# Patient Record
Sex: FEMALE | Race: WHITE
[De-identification: names, ages, dates, MRNs, and addresses within clinical notes are randomized per-mention and may not be internally consistent; named-entity substitution may affect disease eponyms.]

---

## 2017-05-22 ENCOUNTER — PRIOR ORIGINAL RECORDS (OUTPATIENT)
Dept: OTHER | Age: 72
End: 2017-05-22

## 2017-05-22 PROBLEM — I70.0 AORTIC ATHEROSCLEROSIS (HCC): Status: ACTIVE | Noted: 2017-05-22

## 2017-05-22 PROCEDURE — 82652 VIT D 1 25-DIHYDROXY: CPT | Performed by: FAMILY MEDICINE

## 2017-05-23 ENCOUNTER — PRIOR ORIGINAL RECORDS (OUTPATIENT)
Dept: OTHER | Age: 72
End: 2017-05-23

## 2017-05-31 ENCOUNTER — LAB SERVICES (OUTPATIENT)
Dept: OTHER | Age: 72
End: 2017-05-31

## 2017-05-31 ENCOUNTER — CHARTING TRANS (OUTPATIENT)
Dept: OTHER | Age: 72
End: 2017-05-31

## 2017-05-31 LAB — RAPID STREP GROUP A: NORMAL

## 2017-08-29 ENCOUNTER — MYAURORA ACCOUNT LINK (OUTPATIENT)
Dept: OTHER | Age: 72
End: 2017-08-29

## 2017-08-29 ENCOUNTER — PRIOR ORIGINAL RECORDS (OUTPATIENT)
Dept: OTHER | Age: 72
End: 2017-08-29

## 2017-09-05 ENCOUNTER — PRIOR ORIGINAL RECORDS (OUTPATIENT)
Dept: OTHER | Age: 72
End: 2017-09-05

## 2017-09-11 LAB
ALBUMIN: 4.2 G/DL
ALKALINE PHOSPHATATE(ALK PHOS): 94 IU/L
BILIRUBIN TOTAL: 0.47 MG/DL
BUN: 19 MG/DL
CALCIUM: 10.3 MG/DL
CHLORIDE: 107 MEQ/L
CHOLESTEROL, TOTAL: 165 MG/DL
CREATININE, SERUM: 1.08 MG/DL
GLUCOSE: 85 MG/DL
HDL CHOLESTEROL: 46 MG/DL
HEMATOCRIT: 44.3 %
HEMOGLOBIN: 14.4 G/DL
LDL CHOLESTEROL: 75 MG/DL
PLATELETS: 395 K/UL
POTASSIUM, SERUM: 5 MEQ/L
PROTEIN, TOTAL: 8.1 G/DL
RED BLOOD COUNT: 4.97 X 10-6/U
SGOT (AST): 13 IU/L
SGPT (ALT): 18 IU/L
SODIUM: 144 MEQ/L
THYROID STIMULATING HORMONE: 2.43 MLU/L
TRIGLYCERIDES: 218 MG/DL
VITAMIN D 25-OH: 29.5 NG/ML
WHITE BLOOD COUNT: 12.87 X 10-3/U

## 2017-09-20 ENCOUNTER — PRIOR ORIGINAL RECORDS (OUTPATIENT)
Dept: OTHER | Age: 72
End: 2017-09-20

## 2017-09-22 ENCOUNTER — PRIOR ORIGINAL RECORDS (OUTPATIENT)
Dept: OTHER | Age: 72
End: 2017-09-22

## 2017-09-22 ENCOUNTER — HOSPITAL ENCOUNTER (OUTPATIENT)
Dept: CT IMAGING | Facility: HOSPITAL | Age: 72
Discharge: HOME | End: 2017-09-22
Attending: INTERNAL MEDICINE
Payer: MEDICARE

## 2017-09-22 ENCOUNTER — NURSE ONLY (OUTPATIENT)
Dept: LAB | Facility: HOSPITAL | Age: 72
End: 2017-09-22
Attending: INTERNAL MEDICINE
Payer: MEDICARE

## 2017-09-22 DIAGNOSIS — I25.10 CAD (CORONARY ARTERY DISEASE): ICD-10-CM

## 2017-09-22 DIAGNOSIS — I70.0 ATHEROSCLEROSIS OF AORTA (HCC): ICD-10-CM

## 2017-09-22 DIAGNOSIS — Z01.818 PRE-OP EXAM: Primary | ICD-10-CM

## 2017-09-22 DIAGNOSIS — G80.9 CP (CEREBRAL PALSY) (HCC): ICD-10-CM

## 2017-09-22 DIAGNOSIS — R06.02 SOB (SHORTNESS OF BREATH): ICD-10-CM

## 2017-09-22 LAB
BUN BLD-MCNC: 18 MG/DL (ref 8–20)
CREAT BLD-MCNC: 1.1 MG/DL (ref 0.55–1.02)

## 2017-09-22 PROCEDURE — 36415 COLL VENOUS BLD VENIPUNCTURE: CPT

## 2017-09-22 PROCEDURE — 75574 CT ANGIO HRT W/3D IMAGE: CPT | Performed by: INTERNAL MEDICINE

## 2017-09-22 PROCEDURE — 82565 ASSAY OF CREATININE: CPT

## 2017-09-22 PROCEDURE — 84520 ASSAY OF UREA NITROGEN: CPT

## 2017-09-22 RX ORDER — METOPROLOL TARTRATE 5 MG/5ML
INJECTION INTRAVENOUS
Status: DISCONTINUED
Start: 2017-09-22 | End: 2017-09-22 | Stop reason: WASHOUT

## 2017-09-22 RX ORDER — NITROGLYCERIN 0.4 MG/1
TABLET SUBLINGUAL
Status: COMPLETED
Start: 2017-09-22 | End: 2017-09-22

## 2017-09-22 RX ADMIN — NITROGLYCERIN: 0.4 TABLET SUBLINGUAL at 09:30:00

## 2017-09-28 LAB
BUN: 18 MG/DL
CREATININE, SERUM: 1.1 MG/DL

## 2017-09-29 ENCOUNTER — PRIOR ORIGINAL RECORDS (OUTPATIENT)
Dept: OTHER | Age: 72
End: 2017-09-29

## 2017-10-02 ENCOUNTER — PRIOR ORIGINAL RECORDS (OUTPATIENT)
Dept: OTHER | Age: 72
End: 2017-10-02

## 2017-10-05 ENCOUNTER — PRIOR ORIGINAL RECORDS (OUTPATIENT)
Dept: OTHER | Age: 72
End: 2017-10-05

## 2017-10-05 ENCOUNTER — LAB ENCOUNTER (OUTPATIENT)
Dept: LAB | Facility: HOSPITAL | Age: 72
End: 2017-10-05
Attending: INTERNAL MEDICINE
Payer: MEDICARE

## 2017-10-05 ENCOUNTER — MYAURORA ACCOUNT LINK (OUTPATIENT)
Dept: OTHER | Age: 72
End: 2017-10-05

## 2017-10-05 DIAGNOSIS — I25.10 CAD (CORONARY ARTERY DISEASE): Primary | ICD-10-CM

## 2017-10-05 DIAGNOSIS — E78.2 MIXED HYPERLIPIDEMIA: ICD-10-CM

## 2017-10-05 PROCEDURE — 80048 BASIC METABOLIC PNL TOTAL CA: CPT

## 2017-10-05 PROCEDURE — 85025 COMPLETE CBC W/AUTO DIFF WBC: CPT

## 2017-10-05 PROCEDURE — 36415 COLL VENOUS BLD VENIPUNCTURE: CPT

## 2017-10-09 RX ORDER — ASPIRIN 325 MG
325 TABLET ORAL DAILY
COMMUNITY

## 2017-10-09 NOTE — HISTORICAL OFFICE NOTE
Dorita Fragoso  : 1945  ACCOUNT:  [id number]  [id number]  PCP: Dr. Nacho Ugarte: 2017  DICTATED BY:  Edith Serrano M.D.]    HPI: This is a follow-up visit for Linden Mariano.  It has been several years since she saw me in the off clear to auscultation. GI: no masses, tenderness or hepatosplenomegaly, rectal deferred and central adiposity. MS: adequate gait for exercise/testing. EXT: no clubbing or cyanosis.   SKIN: no rashes, lesions, ulcers and vericosities LE.  NEURO/PSYCH: alert understanding and we will move forward as planned.

## 2017-10-11 ENCOUNTER — HOSPITAL ENCOUNTER (OUTPATIENT)
Dept: INTERVENTIONAL RADIOLOGY/VASCULAR | Facility: HOSPITAL | Age: 72
Discharge: HOME | End: 2017-10-11
Attending: INTERNAL MEDICINE | Admitting: INTERNAL MEDICINE
Payer: MEDICARE

## 2017-10-11 VITALS
SYSTOLIC BLOOD PRESSURE: 117 MMHG | DIASTOLIC BLOOD PRESSURE: 62 MMHG | HEART RATE: 86 BPM | WEIGHT: 197 LBS | RESPIRATION RATE: 18 BRPM | BODY MASS INDEX: 31.66 KG/M2 | OXYGEN SATURATION: 99 % | HEIGHT: 66 IN | TEMPERATURE: 98 F

## 2017-10-11 DIAGNOSIS — I25.10 ASHD (ARTERIOSCLEROTIC HEART DISEASE): ICD-10-CM

## 2017-10-11 DIAGNOSIS — I25.10 CAD (CORONARY ARTERY DISEASE): ICD-10-CM

## 2017-10-11 PROCEDURE — 99153 MOD SED SAME PHYS/QHP EA: CPT

## 2017-10-11 PROCEDURE — 93458 L HRT ARTERY/VENTRICLE ANGIO: CPT

## 2017-10-11 PROCEDURE — 99152 MOD SED SAME PHYS/QHP 5/>YRS: CPT

## 2017-10-11 PROCEDURE — 92978 ENDOLUMINL IVUS OCT C 1ST: CPT

## 2017-10-11 PROCEDURE — 4A033BC MEASUREMENT OF ARTERIAL PRESSURE, CORONARY, PERCUTANEOUS APPROACH: ICD-10-PCS | Performed by: INTERNAL MEDICINE

## 2017-10-11 PROCEDURE — B240ZZ3 ULTRASONOGRAPHY OF SINGLE CORONARY ARTERY, INTRAVASCULAR: ICD-10-PCS | Performed by: INTERNAL MEDICINE

## 2017-10-11 PROCEDURE — 4A023N7 MEASUREMENT OF CARDIAC SAMPLING AND PRESSURE, LEFT HEART, PERCUTANEOUS APPROACH: ICD-10-PCS | Performed by: INTERNAL MEDICINE

## 2017-10-11 PROCEDURE — B2111ZZ FLUOROSCOPY OF MULTIPLE CORONARY ARTERIES USING LOW OSMOLAR CONTRAST: ICD-10-PCS | Performed by: INTERNAL MEDICINE

## 2017-10-11 PROCEDURE — B2151ZZ FLUOROSCOPY OF LEFT HEART USING LOW OSMOLAR CONTRAST: ICD-10-PCS | Performed by: INTERNAL MEDICINE

## 2017-10-11 PROCEDURE — 93571 IV DOP VEL&/PRESS C FLO 1ST: CPT

## 2017-10-11 RX ORDER — SODIUM CHLORIDE 9 MG/ML
INJECTION, SOLUTION INTRAVENOUS CONTINUOUS
Status: ACTIVE | OUTPATIENT
Start: 2017-10-11 | End: 2017-10-11

## 2017-10-11 RX ORDER — LIDOCAINE HYDROCHLORIDE 10 MG/ML
INJECTION, SOLUTION INFILTRATION; PERINEURAL
Status: COMPLETED
Start: 2017-10-11 | End: 2017-10-11

## 2017-10-11 RX ORDER — MIDAZOLAM HYDROCHLORIDE 1 MG/ML
INJECTION INTRAMUSCULAR; INTRAVENOUS
Status: COMPLETED
Start: 2017-10-11 | End: 2017-10-11

## 2017-10-11 RX ORDER — HEPARIN SODIUM 5000 [USP'U]/ML
INJECTION, SOLUTION INTRAVENOUS; SUBCUTANEOUS
Status: COMPLETED
Start: 2017-10-11 | End: 2017-10-11

## 2017-10-11 RX ORDER — ASPIRIN 81 MG/1
324 TABLET, CHEWABLE ORAL ONCE
Status: DISCONTINUED | OUTPATIENT
Start: 2017-10-11 | End: 2017-10-11

## 2017-10-11 RX ORDER — ADENOSINE 3 MG/ML
INJECTION, SOLUTION INTRAVENOUS
Status: COMPLETED
Start: 2017-10-11 | End: 2017-10-11

## 2017-10-11 RX ORDER — HEPARIN SODIUM 1000 [USP'U]/ML
INJECTION, SOLUTION INTRAVENOUS; SUBCUTANEOUS
Status: COMPLETED
Start: 2017-10-11 | End: 2017-10-11

## 2017-10-11 RX ORDER — SODIUM CHLORIDE 9 MG/ML
INJECTION, SOLUTION INTRAVENOUS CONTINUOUS
Status: DISCONTINUED | OUTPATIENT
Start: 2017-10-11 | End: 2017-10-11

## 2017-10-11 RX ADMIN — SODIUM CHLORIDE: 9 INJECTION, SOLUTION INTRAVENOUS at 11:45:00

## 2017-10-11 RX ADMIN — SODIUM CHLORIDE: 9 INJECTION, SOLUTION INTRAVENOUS at 08:00:00

## 2017-10-11 NOTE — PROGRESS NOTES
Patient is s/p C; a/o x 4; hemodynamically stable/neurologically intact; right groin site soft/stable with no bleeding/hematoma noted after ambulating in halls; denies pain; IV site removed intact;  Patient verbalized understanding of dc instructions/foll

## 2017-10-11 NOTE — PROCEDURES
[address]    PATIENT'S NAME: Yakov Erlindabrad   ATTENDING PHYSICIAN: Zaira Morris M.D. OPERATING PHYSICIAN: Zaira Morris M.D.    PATIENT ACCOUNT#:   [de-identified]    LOCATION:  [facility] 6 EDWP 10  MEDICAL RECORD #:   [id number]       DATE OF insufficiency was noted. No gradient was present across the aortic valve. CORONARY ARTERIOGRAPHY:  Coronary circulation was right dominant and a mild amount of vascular calcification was identified fluoroscopically.     The left main coronary artery had patient is a 11-year-old woman who on cardiac catheterization today was demonstrated to have moderate angiographic coronary atherosclerosis with preserved LV systolic function.   QCA intravascular ultrasound and FFR measurements were not supportive of flow-

## 2017-10-11 NOTE — PROGRESS NOTES
Prelim cath    Normal LV function   --- EDP 8    Mild - moderate angiographically non-obstructive CAD in LAD    LM/LCX/RCA randal    LAD assessed by IVUS/FFR and QCA    Greatest IVUS stenosis 54% proximally yet FFR was normal -- greatest QCA stenosis 44%

## 2017-10-13 LAB
BUN: 18 MG/DL
CALCIUM: 9.5 MG/DL
CHLORIDE: 111 MEQ/L
CREATININE, SERUM: 1.23 MG/DL
GLUCOSE: 93 MG/DL
HEMATOCRIT: 44.2 %
HEMOGLOBIN: 15 G/DL
PLATELETS: 290 K/UL
POTASSIUM, SERUM: 4.2 MEQ/L
RED BLOOD COUNT: 5.1 X 10-6/U
SODIUM: 140 MEQ/L
WHITE BLOOD COUNT: 8.7 X 10-3/U

## 2017-10-23 ENCOUNTER — PRIOR ORIGINAL RECORDS (OUTPATIENT)
Dept: OTHER | Age: 72
End: 2017-10-23

## 2017-10-23 ENCOUNTER — MYAURORA ACCOUNT LINK (OUTPATIENT)
Dept: OTHER | Age: 72
End: 2017-10-23

## 2018-12-02 VITALS
HEART RATE: 90 BPM | WEIGHT: 200.99 LBS | BODY MASS INDEX: 33.49 KG/M2 | HEIGHT: 65 IN | SYSTOLIC BLOOD PRESSURE: 100 MMHG | TEMPERATURE: 98.3 F | RESPIRATION RATE: 18 BRPM | DIASTOLIC BLOOD PRESSURE: 64 MMHG

## 2019-02-28 VITALS — SYSTOLIC BLOOD PRESSURE: 142 MMHG | DIASTOLIC BLOOD PRESSURE: 78 MMHG | HEART RATE: 92 BPM

## 2019-02-28 VITALS — DIASTOLIC BLOOD PRESSURE: 78 MMHG | SYSTOLIC BLOOD PRESSURE: 142 MMHG | HEART RATE: 92 BPM

## 2019-02-28 VITALS
HEART RATE: 102 BPM | DIASTOLIC BLOOD PRESSURE: 88 MMHG | WEIGHT: 203 LBS | SYSTOLIC BLOOD PRESSURE: 122 MMHG | BODY MASS INDEX: 33.78 KG/M2

## 2019-03-18 PROBLEM — N18.30 CKD (CHRONIC KIDNEY DISEASE) STAGE 3, GFR 30-59 ML/MIN (HCC): Status: ACTIVE | Noted: 2019-03-18

## 2019-05-30 PROCEDURE — 81003 URINALYSIS AUTO W/O SCOPE: CPT | Performed by: FAMILY MEDICINE

## 2020-06-01 ENCOUNTER — OFFICE VISIT (OUTPATIENT)
Dept: NEPHROLOGY | Facility: CLINIC | Age: 75
End: 2020-06-01
Payer: COMMERCIAL

## 2020-06-01 VITALS — SYSTOLIC BLOOD PRESSURE: 132 MMHG | BODY MASS INDEX: 34 KG/M2 | WEIGHT: 203.63 LBS | DIASTOLIC BLOOD PRESSURE: 74 MMHG

## 2020-06-01 DIAGNOSIS — N18.30 CKD (CHRONIC KIDNEY DISEASE) STAGE 3, GFR 30-59 ML/MIN (HCC): Primary | ICD-10-CM

## 2020-06-01 PROCEDURE — 99204 OFFICE O/P NEW MOD 45 MIN: CPT | Performed by: INTERNAL MEDICINE

## 2021-02-02 DIAGNOSIS — Z23 NEED FOR VACCINATION: ICD-10-CM

## 2022-02-16 NOTE — PROGRESS NOTES
Nephrology Consult Note    REASON FOR CONSULT: CKD 3    ASSESSMENT/PLAN:        1) CKD 3- serum creatinine approximately 1.1 to 1.2 mg/dL since 2016 likely represents PPI associated chronic interstitial fibrosis and possibly age associated nephrosclerosis; [place]. No history of acute renal failure gross microscopic hematuria kidney stones or infections. No family history of kidney disease. Took Nexium for a number of years about 10 years ago until 5 years ago.   Otherwise please see above for further d children: Not on file      Years of education: Not on file      Highest education level: Not on file    Tobacco Use      Smoking status: Former Smoker        Packs/day: 0.50        Years: 40.00        Pack years: 20        Types: Cigarettes        Quit edmar Not applicable

## 2022-07-28 ENCOUNTER — HOSPITAL ENCOUNTER (OUTPATIENT)
Age: 77
Discharge: HOME | End: 2022-07-28
Payer: MEDICARE

## 2022-07-28 VITALS
HEIGHT: 64 IN | HEART RATE: 70 BPM | WEIGHT: 190 LBS | SYSTOLIC BLOOD PRESSURE: 122 MMHG | DIASTOLIC BLOOD PRESSURE: 72 MMHG | RESPIRATION RATE: 16 BRPM | OXYGEN SATURATION: 97 % | TEMPERATURE: 97 F | BODY MASS INDEX: 32.44 KG/M2

## 2022-07-28 DIAGNOSIS — U07.1 COVID-19 VIRUS INFECTION: Primary | ICD-10-CM

## 2022-07-28 PROCEDURE — M0222 INTRAVENOUS INJECTION, BEBTELOVIMAB, INCLUDES INJECTION AND POST ADMINISTRATIVE MONITORING: HCPCS | Performed by: NURSE PRACTITIONER

## 2022-07-28 PROCEDURE — 99203 OFFICE O/P NEW LOW 30 MIN: CPT | Performed by: NURSE PRACTITIONER

## 2022-07-28 RX ORDER — BEBTELOVIMAB 87.5 MG/ML
175 INJECTION, SOLUTION INTRAVENOUS ONCE
Status: COMPLETED | OUTPATIENT
Start: 2022-07-28 | End: 2022-07-28

## 2023-05-23 ENCOUNTER — HOSPITAL ENCOUNTER (OUTPATIENT)
Dept: CV DIAGNOSTICS | Facility: HOSPITAL | Age: 78
Discharge: HOME | End: 2023-05-23
Attending: FAMILY MEDICINE
Payer: MEDICARE

## 2023-05-23 DIAGNOSIS — R06.09 DOE (DYSPNEA ON EXERTION): ICD-10-CM

## 2023-05-23 PROCEDURE — 93306 TTE W/DOPPLER COMPLETE: CPT | Performed by: FAMILY MEDICINE

## 2024-06-24 ENCOUNTER — ANESTHESIA (OUTPATIENT)
Dept: SURGERY | Facility: HOSPITAL | Age: 79
End: 2024-06-24

## 2024-06-24 ENCOUNTER — HOSPITAL ENCOUNTER (OUTPATIENT)
Facility: HOSPITAL | Age: 79
Setting detail: HOSPITAL OUTPATIENT SURGERY
Discharge: HOME | End: 2024-06-24
Attending: UROLOGY | Admitting: UROLOGY

## 2024-06-24 ENCOUNTER — ANESTHESIA EVENT (OUTPATIENT)
Dept: SURGERY | Facility: HOSPITAL | Age: 79
End: 2024-06-24

## 2024-06-24 ENCOUNTER — APPOINTMENT (OUTPATIENT)
Dept: GENERAL RADIOLOGY | Facility: HOSPITAL | Age: 79
End: 2024-06-24
Attending: UROLOGY

## 2024-06-24 VITALS
BODY MASS INDEX: 32.78 KG/M2 | HEART RATE: 80 BPM | OXYGEN SATURATION: 98 % | RESPIRATION RATE: 18 BRPM | WEIGHT: 192 LBS | TEMPERATURE: 98 F | DIASTOLIC BLOOD PRESSURE: 84 MMHG | HEIGHT: 64.25 IN | SYSTOLIC BLOOD PRESSURE: 148 MMHG

## 2024-06-24 DIAGNOSIS — N20.0 KIDNEY STONE: Primary | ICD-10-CM

## 2024-06-24 PROCEDURE — 0T768DZ DILATION OF RIGHT URETER WITH INTRALUMINAL DEVICE, VIA NATURAL OR ARTIFICIAL OPENING ENDOSCOPIC: ICD-10-PCS | Performed by: UROLOGY

## 2024-06-24 PROCEDURE — 0TF68ZZ FRAGMENTATION IN RIGHT URETER, VIA NATURAL OR ARTIFICIAL OPENING ENDOSCOPIC: ICD-10-PCS | Performed by: UROLOGY

## 2024-06-24 PROCEDURE — S0028 INJECTION, FAMOTIDINE, 20 MG: HCPCS | Performed by: ANESTHESIOLOGY

## 2024-06-24 DEVICE — URETERAL STENT
Type: IMPLANTABLE DEVICE | Site: URETER | Status: FUNCTIONAL
Brand: ASCERTA™

## 2024-06-24 RX ORDER — HYDROMORPHONE HYDROCHLORIDE 1 MG/ML
0.6 INJECTION, SOLUTION INTRAMUSCULAR; INTRAVENOUS; SUBCUTANEOUS EVERY 5 MIN PRN
Status: DISCONTINUED | OUTPATIENT
Start: 2024-06-24 | End: 2024-06-24

## 2024-06-24 RX ORDER — MEPERIDINE HYDROCHLORIDE 25 MG/ML
12.5 INJECTION INTRAMUSCULAR; INTRAVENOUS; SUBCUTANEOUS AS NEEDED
Status: DISCONTINUED | OUTPATIENT
Start: 2024-06-24 | End: 2024-06-24

## 2024-06-24 RX ORDER — ONDANSETRON 2 MG/ML
4 INJECTION INTRAMUSCULAR; INTRAVENOUS EVERY 6 HOURS PRN
Status: DISCONTINUED | OUTPATIENT
Start: 2024-06-24 | End: 2024-06-24

## 2024-06-24 RX ORDER — ACETAMINOPHEN 500 MG
1000 TABLET ORAL ONCE AS NEEDED
Status: COMPLETED | OUTPATIENT
Start: 2024-06-24 | End: 2024-06-24

## 2024-06-24 RX ORDER — HYDROCODONE BITARTRATE AND ACETAMINOPHEN 5; 325 MG/1; MG/1
2 TABLET ORAL ONCE AS NEEDED
Status: COMPLETED | OUTPATIENT
Start: 2024-06-24 | End: 2024-06-24

## 2024-06-24 RX ORDER — TRAMADOL HYDROCHLORIDE 50 MG/1
TABLET ORAL EVERY 6 HOURS PRN
Qty: 10 TABLET | Refills: 0 | Status: SHIPPED | OUTPATIENT
Start: 2024-06-24

## 2024-06-24 RX ORDER — SODIUM CHLORIDE, SODIUM LACTATE, POTASSIUM CHLORIDE, CALCIUM CHLORIDE 600; 310; 30; 20 MG/100ML; MG/100ML; MG/100ML; MG/100ML
INJECTION, SOLUTION INTRAVENOUS CONTINUOUS
Status: DISCONTINUED | OUTPATIENT
Start: 2024-06-24 | End: 2024-06-24

## 2024-06-24 RX ORDER — METOCLOPRAMIDE HYDROCHLORIDE 5 MG/ML
10 INJECTION INTRAMUSCULAR; INTRAVENOUS EVERY 8 HOURS PRN
Status: DISCONTINUED | OUTPATIENT
Start: 2024-06-24 | End: 2024-06-24

## 2024-06-24 RX ORDER — NEOSTIGMINE METHYLSULFATE 1 MG/ML
INJECTION, SOLUTION INTRAVENOUS AS NEEDED
Status: DISCONTINUED | OUTPATIENT
Start: 2024-06-24 | End: 2024-06-25 | Stop reason: SURG

## 2024-06-24 RX ORDER — THYROID 120 MG/1
120 TABLET ORAL DAILY
COMMUNITY
Start: 2023-04-18

## 2024-06-24 RX ORDER — NALOXONE HYDROCHLORIDE 0.4 MG/ML
80 INJECTION, SOLUTION INTRAMUSCULAR; INTRAVENOUS; SUBCUTANEOUS AS NEEDED
Status: DISCONTINUED | OUTPATIENT
Start: 2024-06-24 | End: 2024-06-24

## 2024-06-24 RX ORDER — GLYCOPYRROLATE 0.2 MG/ML
INJECTION, SOLUTION INTRAMUSCULAR; INTRAVENOUS AS NEEDED
Status: DISCONTINUED | OUTPATIENT
Start: 2024-06-24 | End: 2024-06-25 | Stop reason: SURG

## 2024-06-24 RX ORDER — HYDROMORPHONE HYDROCHLORIDE 1 MG/ML
0.2 INJECTION, SOLUTION INTRAMUSCULAR; INTRAVENOUS; SUBCUTANEOUS EVERY 5 MIN PRN
Status: DISCONTINUED | OUTPATIENT
Start: 2024-06-24 | End: 2024-06-24

## 2024-06-24 RX ORDER — LIDOCAINE HYDROCHLORIDE 10 MG/ML
INJECTION, SOLUTION EPIDURAL; INFILTRATION; INTRACAUDAL; PERINEURAL AS NEEDED
Status: DISCONTINUED | OUTPATIENT
Start: 2024-06-24 | End: 2024-06-25 | Stop reason: SURG

## 2024-06-24 RX ORDER — HYDROMORPHONE HYDROCHLORIDE 1 MG/ML
0.4 INJECTION, SOLUTION INTRAMUSCULAR; INTRAVENOUS; SUBCUTANEOUS EVERY 5 MIN PRN
Status: DISCONTINUED | OUTPATIENT
Start: 2024-06-24 | End: 2024-06-24

## 2024-06-24 RX ORDER — FAMOTIDINE 10 MG/ML
INJECTION, SOLUTION INTRAVENOUS AS NEEDED
Status: DISCONTINUED | OUTPATIENT
Start: 2024-06-24 | End: 2024-06-25 | Stop reason: SURG

## 2024-06-24 RX ORDER — LIDOCAINE HYDROCHLORIDE 20 MG/ML
JELLY TOPICAL AS NEEDED
Status: DISCONTINUED | OUTPATIENT
Start: 2024-06-24 | End: 2024-06-24 | Stop reason: HOSPADM

## 2024-06-24 RX ORDER — METOCLOPRAMIDE HYDROCHLORIDE 5 MG/ML
INJECTION INTRAMUSCULAR; INTRAVENOUS AS NEEDED
Status: DISCONTINUED | OUTPATIENT
Start: 2024-06-24 | End: 2024-06-25 | Stop reason: SURG

## 2024-06-24 RX ORDER — ROCURONIUM BROMIDE 10 MG/ML
INJECTION, SOLUTION INTRAVENOUS AS NEEDED
Status: DISCONTINUED | OUTPATIENT
Start: 2024-06-24 | End: 2024-06-25 | Stop reason: SURG

## 2024-06-24 RX ORDER — ONDANSETRON 2 MG/ML
INJECTION INTRAMUSCULAR; INTRAVENOUS AS NEEDED
Status: DISCONTINUED | OUTPATIENT
Start: 2024-06-24 | End: 2024-06-25 | Stop reason: SURG

## 2024-06-24 RX ORDER — NITROFURANTOIN 25; 75 MG/1; MG/1
100 CAPSULE ORAL 2 TIMES DAILY
Qty: 14 CAPSULE | Refills: 0 | Status: SHIPPED | OUTPATIENT
Start: 2024-06-24 | End: 2024-06-29

## 2024-06-24 RX ORDER — ACETAMINOPHEN 500 MG
1000 TABLET ORAL ONCE
Status: DISCONTINUED | OUTPATIENT
Start: 2024-06-24 | End: 2024-06-24 | Stop reason: HOSPADM

## 2024-06-24 RX ORDER — HYDROCODONE BITARTRATE AND ACETAMINOPHEN 5; 325 MG/1; MG/1
1 TABLET ORAL ONCE AS NEEDED
Status: COMPLETED | OUTPATIENT
Start: 2024-06-24 | End: 2024-06-24

## 2024-06-24 RX ORDER — DIPHENHYDRAMINE HYDROCHLORIDE 50 MG/ML
12.5 INJECTION INTRAMUSCULAR; INTRAVENOUS AS NEEDED
Status: DISCONTINUED | OUTPATIENT
Start: 2024-06-24 | End: 2024-06-24

## 2024-06-24 RX ORDER — PHENAZOPYRIDINE HYDROCHLORIDE 100 MG/1
100 TABLET, FILM COATED ORAL 3 TIMES DAILY PRN
Qty: 15 TABLET | Refills: 1 | Status: SHIPPED | OUTPATIENT
Start: 2024-06-24 | End: 2024-06-29

## 2024-06-24 RX ORDER — LABETALOL HYDROCHLORIDE 5 MG/ML
5 INJECTION, SOLUTION INTRAVENOUS EVERY 5 MIN PRN
Status: DISCONTINUED | OUTPATIENT
Start: 2024-06-24 | End: 2024-06-24

## 2024-06-24 RX ADMIN — SODIUM CHLORIDE, SODIUM LACTATE, POTASSIUM CHLORIDE, CALCIUM CHLORIDE: 600; 310; 30; 20 INJECTION, SOLUTION INTRAVENOUS at 16:13:00

## 2024-06-24 RX ADMIN — ONDANSETRON 4 MG: 2 INJECTION INTRAMUSCULAR; INTRAVENOUS at 15:09:00

## 2024-06-24 RX ADMIN — LIDOCAINE HYDROCHLORIDE 75 MG: 10 INJECTION, SOLUTION EPIDURAL; INFILTRATION; INTRACAUDAL; PERINEURAL at 15:17:00

## 2024-06-24 RX ADMIN — ROCURONIUM BROMIDE 20 MG: 10 INJECTION, SOLUTION INTRAVENOUS at 15:17:00

## 2024-06-24 RX ADMIN — SODIUM CHLORIDE, SODIUM LACTATE, POTASSIUM CHLORIDE, CALCIUM CHLORIDE: 600; 310; 30; 20 INJECTION, SOLUTION INTRAVENOUS at 15:08:00

## 2024-06-24 RX ADMIN — FAMOTIDINE 20 MG: 10 INJECTION, SOLUTION INTRAVENOUS at 15:18:00

## 2024-06-24 RX ADMIN — NEOSTIGMINE METHYLSULFATE 2 MG: 1 INJECTION, SOLUTION INTRAVENOUS at 16:08:00

## 2024-06-24 RX ADMIN — METOCLOPRAMIDE HYDROCHLORIDE 10 MG: 5 INJECTION INTRAMUSCULAR; INTRAVENOUS at 15:09:00

## 2024-06-24 RX ADMIN — GLYCOPYRROLATE 0.3 MG: 0.2 INJECTION, SOLUTION INTRAMUSCULAR; INTRAVENOUS at 16:08:00

## 2024-06-24 NOTE — OPERATIVE REPORT
[facility]   OPERATIVE REPORT    Chloe Wall Patient Status:  Hospital Outpatient Surgery    1945 MRN [id number]   Location [facility] POST ANESTHESIA CARE UNIT Attending Robbin Esteban MD    Day # 0 PCP Edward Morris MD        DATE of OPERATION: 2024  SURGEON: Robbin Esteban MD    PREOPERATIVE DIAGNOSIS: Right 1.5 cm kidney calculus.  POSTOPERATIVE DIAGNOSIS: Right 1.5 cm kidney calculus.    PROCEDURE PERFORMED:    1. Cystoscopy  2. Right Retrograde Pyelogram  3. Placement of Right Ureteral Stent   4. Ureteroscopy, Stone laser lithotripsy    ANESTHESIA:  General.  EBL: 5 ml   COMPLICATIONS: None.   INDWELLING CATHETER: 6 Fr x 24 cm Double J ureter stent.  SPECIMEN: Stone fragments.      INDICATIONS: The patient is a 78 year old female, who presented with CT proven right 1.5 cm kidney stone without hydroureteronephrosis.  The patient presents for definitive surgical intervention.  All risks, benefits, and potential adverse event of the procedure were discussed and a consent form was signed.     TECHNIQUE:  The patient was brought back to the operating room and placed in supine position.  A general anesthesia was induced and a time-out was reviewed.  Preoperative antibiotics were administered.  The patient was prepped and draped in the dorsal lithotomy position.  Sequential compression devices were in place on the lower legs.     The cystoscope was passed into the bladder and the bladder was evaluated in a systematic manner. There were no lesions, diverticula, or masses noted. The ureteral orrifices were in normal position.  The cystoscope was used to guide an open ended 5 Fr ureter catheter into the right ureteral orifice and water soluble contrast was injected in a retrograde manner.  There was no dilation of ureter or kidney calyces observed, but there was a right kidney filling defect, most likely signifying the stone.  A 0.038 guide wire was then advanced via the open end  catheter into the  ureter and up to the renal collecting system.  A ureter access sheath was placed as well.  The flexible ureteroscope was advanced  via the access sheath into the ureter and the right renal collectin system where the stone was visualized. The stone was fragmented into small pieces with the 200 laser fiber.  The stone fragments were then left in situ for spontaneous passage.  A 6-Korean x 24 cm double J stent was then passed over the guide wire up into the collecting system.  The stent was in good position which was confirmed via live fluoro x-ray.  There were no complications during this procedure.  The bladder was emptied of urine at the end of the procedure.     The patient was awakened and taken to the recovery area in stable condition. The patient will be discharged to home and follow up in clinic for stent removal.     Robbin Esteban MD  6/24/2024

## 2024-06-24 NOTE — ANESTHESIA PROCEDURE NOTES
Airway  Date/Time: 6/24/2024 3:17 PM  Urgency: elective    Airway not difficult    General Information and Staff    Patient location during procedure: OR  Anesthesiologist: Shade Garcia MD  Performed: anesthesiologist   Performed by: Shade Garcia MD  Authorized by: Shade Garcia MD      Indications and Patient Condition  Indications for airway management: anesthesia  Spontaneous Ventilation: absent  Sedation level: deep  Preoxygenated: yes  Patient position: sniffing  Mask difficulty assessment: 0 - not attempted    Final Airway Details  Final airway type: endotracheal airway      Successful airway: ETT  Cuffed: yes   Successful intubation technique: Video laryngoscopy  Facilitating devices/methods: cricoid pressure and intubating stylet  Endotracheal tube insertion site: oral  Blade: GlideScope  Blade size: #3  ETT size (mm): 7.0    Cormack-Lehane Classification: grade I - full view of glottis  Placement verified by: capnometry   Cuff volume (mL): 8  Measured from: lips  ETT to lips (cm): 22  Number of attempts at approach: 1  Number of other approaches attempted: 0

## 2024-06-24 NOTE — H&P
Pre-op Diagnosis: KIDNEY STONES    The above referenced H&P by Dr Edward Morris from 5/25/24 was reviewed by Robbin Esteban MD on 6/24/2024, the patient was examined and no significant changes have occurred in the patient's condition since the H&P was performed.  I discussed with the patient and/or legal representative the potential benefits, risks and side effects of this procedure; the likelihood of the patient achieving goals; and potential problems that might occur during recuperation.  I discussed reasonable alternatives to the procedure, including risks, benefits and side effects related to the alternatives and risks related to not receiving this procedure.  We will proceed with procedure as planned.    ISIAH Celaya MD   6/24/24/

## 2024-06-24 NOTE — ANESTHESIA PREPROCEDURE EVALUATION
PRE-OP EVALUATION    Patient Name: Chloe Wall    Admit Diagnosis: KIDNEY STONES    Pre-op Diagnosis: KIDNEY STONES    CYSTOSCOPY, RIGHT RETROGRADE PYELOGRAM, RIGHT URETEROSCOPY, RIGHT URETERAL STENT PLACEMENT, HOLMIUM LASER LITHOTRIPSY    Anesthesia Procedure: CYSTOSCOPY, RIGHT RETROGRADE PYELOGRAM, RIGHT URETEROSCOPY, RIGHT URETERAL STENT PLACEMENT, HOLMIUM LASER LITHOTRIPSY (Right)    Surgeons and Role:     * Robbin Esteban MD - Primary    Pre-op vitals reviewed.  Temp: 97.5 °F (36.4 °C)  Pulse: 69  Resp: 16  BP: 141/75  SpO2: 97 %  Body mass index is 32.7 kg/m².    Current medications reviewed.  Hospital Medications:   [Transfer Hold] acetaminophen (Tylenol Extra Strength) tab 1,000 mg  1,000 mg Oral Once    lactated ringers infusion   Intravenous Continuous    ceFAZolin (Ancef) 2g in 10mL IV syringe premix  2 g Intravenous Once       Outpatient Medications:     Medications Prior to Admission   Medication Sig Dispense Refill Last Dose    NON FORMULARY Diatomaceous earth -powder packet 1 tbsp bid   6/16/2024    thyroid 120 MG Oral Tab Take 1 tablet (120 mg total) by mouth daily.   More than a month    atorvastatin 20 MG Oral Tab Take 1 tablet (20 mg total) by mouth once daily. 90 tablet 1 More than a month    FENOFIBRATE 54 MG Oral Tab Take 1 tablet by mouth once daily 90 tablet 2 More than a month    aspirin 325 MG Oral Tab Take 1 tablet (325 mg total) by mouth daily.   More than a month    TUMS OR PRN   6/15/2024       Allergies: Pcn [penicillins] and Sulfa antibiotics      Anesthesia Evaluation    Patient summary reviewed.    Anesthetic Complications  (+) history of anesthetic complications  History of: PONV       GI/Hepatic/Renal      (+) GERD (c/o active reflux preop) and poorly controlled      (+) chronic renal disease (CKD)                    Cardiovascular      ECG reviewed.  Exercise tolerance: good     MET: >4           (+) CAD  (-) past MI  (-) CABG/stent              (-) angina     (-)  GUPTA  (-) orthopnea  (-) PND     Endo/Other    Negative endo/other ROS.       (+) hypothyroidism                       Pulmonary    Negative pulmonary ROS.           (-) shortness of breath  (-) recent URI          Neuro/Psych    Negative neuro/psych ROS.                                  Past Surgical History:   Procedure Laterality Date      ,,    Cholecystectomy      Colonoscopy  2006    polyps  [place]    Colonoscopy      diverticlitis, [place]    Other surgical history      laparoscopy hemicolectomy  [place]     Social History     Socioeconomic History    Marital status:    Tobacco Use    Smoking status: Former     Current packs/day: 0.00     Types: Cigarettes     Quit date: 1972     Years since quittin.5    Smokeless tobacco: Never   Vaping Use    Vaping status: Never Used   Substance and Sexual Activity    Alcohol use: Yes     Comment: minimally    Drug use: No    Sexual activity: Yes     Partners: Male   Other Topics Concern    Exercise Yes     History   Drug Use No     Available pre-op labs reviewed.               Airway      Mallampati: II  Mouth opening: 3 FB  TM distance: 4 - 6 cm  Neck ROM: full Cardiovascular    Cardiovascular exam normal.  Rhythm: regular  Rate: normal  (-) murmur   Dental    Dentition appears grossly intact         Pulmonary    Pulmonary exam normal.  Breath sounds clear to auscultation bilaterally.        (-) wheezes       Other findings                ECG rhythm:   Normal sinus     ----------------------------------------------------------------------------     Conclusions:     1. Left ventricle: The cavity size was normal. Wall thickness was normal.      Systolic function was normal. The estimated ejection fraction was 60-65%.      Left ventricular diastolic function parameters were normal for the      patient's age.   2. Left atrium: The left atrial volume was normal.   3. Pulmonary arteries: Systolic pressure was within the normal  range,      estimated to be 25mm Hg.   Impressions:  No previous study from [facility] was   available for comparison.   *   Prepared and electronically signed by   Adrian Zhu MD   05/23/2023 16:13   ----------------------------------------------------------------------------   *     ASA: 2   Plan: general  NPO status verified and patient meets guidelines.  Patient has not taken beta blockers in last 24 hours.        Plan/risks discussed with: patient                We discussed GA w/ETT and possible scratchy throat and rarely dental damage.  We discussed analgesic plan and PONV prophylaxis.  The patient's questions were answered and consent was attained.

## 2024-06-24 NOTE — DISCHARGE INSTRUCTIONS
CYSTOSCOPY - Dr. Esteban    Operative site:  Slight burning on urination may be experienced with the first few urinations.  Scant blood may appear in the urine and will clear in a few days.  Drinking water and clear liquids is encouraged.  .  Local Anesthesia:  Resume your normal diet and activity as tolerated avoiding stress to the operative site.  Caffeine, alcohol, citrus or spicy foods may worsen burning or urgency.      General anesthesia / Local with sedation:  The sedation stays in your body about 24 hours.  You may have headache, soreness and aching, nausea and vomiting, dizziness, tiredness.  Sore throat and hoarseness can be present after general anesthesia only.  Drink liquids and eat light foods. Advance to your regular diet as tolerated Remain on liquids only if nausea or vomiting is present.  NO ALCHOHOLIC BEVERAGES FOR 24 HOURS.  For the next 24 hours rest, move cautiously, do not drive, operate equipment, or make any personal or legal decisions.      If you have a catheter:  Your catheter remains in place because a balloon close to the catheter tip was inflated with fluid immediately after the catheter’s insertion.  If you have been instructed to remove your catheter at home by your doctor, cut the catheter at the short Y (see black line on photo below). After all the water is drained, gently pull the catheter out. If you have any questions or if you feel pain or resistance when removing the catheter STOP and call your doctor.  After removing the catheter,  it is normal to experience some stinging or burning with urination.    If prescription medication is ordered:  Take as directed.  Do not take on an empty stomach.    Do not drive or operate equipment.  Do not drink alcohol.  Call your doctor for:  Difficulty in urination, inability to urinate, excessive bleeding/discoloration  Severe pain not controlled by pain medication.      Persistent nausea and vomiting.                         Temperature over 101 F.   Skin rash and general body itching.  Other concerns or questions not addressed in these instructions.    Follow any additional instructions given by the surgeon.   IN CASE OF EMERGENCY GO TO THE NEAREST EMERGENCY ROOM.   Call the office for an appointment in 2-4 weeks.  [phone]      You have been given a prescription for Norco 5/325  Norco was Given to you at:6:15 pm  Next dose due: 12:15 am   Take this medication as directed  This medication contains Tylenol (acetaminophen)  Do not take additional Tylenol while taking Norco    Norco is a Narcotic and can be constipating or upset your stomach  Don't take Norco on an empty stomach  Drink plenty of water  Alcoholic beverages should be avoided while taking narcotics

## 2024-06-25 ENCOUNTER — HOSPITAL ENCOUNTER (EMERGENCY)
Age: 79
Discharge: HOME | End: 2024-06-26
Attending: EMERGENCY MEDICINE
Payer: MEDICARE

## 2024-06-25 DIAGNOSIS — R50.9 FEVER, UNSPECIFIED FEVER CAUSE: Primary | ICD-10-CM

## 2024-06-25 PROCEDURE — 99283 EMERGENCY DEPT VISIT LOW MDM: CPT

## 2024-06-25 PROCEDURE — 36415 COLL VENOUS BLD VENIPUNCTURE: CPT

## 2024-06-25 PROCEDURE — 99284 EMERGENCY DEPT VISIT MOD MDM: CPT

## 2024-06-25 NOTE — ANESTHESIA POSTPROCEDURE EVALUATION
[facility]    Chloe Wall Patient Status:  Hospital Outpatient Surgery   Age/Gender 78 year old female MRN [id number]   Location [facility] PERIOPERATIVE SERVICE Attending No att. providers found   Hosp Day # 0 PCP Edward Morris MD       Anesthesia Post-op Note    CYSTOSCOPY, RIGHT RETROGRADE PYELOGRAM, RIGHT URETEROSCOPY, RIGHT URETERAL STENT PLACEMENT, HOLMIUM LASER LITHOTRIPSY    Procedure Summary       Date: 06/24/24 Room / Location:  MAIN OR 07 / [facility] MAIN OR    Anesthesia Start: 1508 Anesthesia Stop: 1625    Procedure: CYSTOSCOPY, RIGHT RETROGRADE PYELOGRAM, RIGHT URETEROSCOPY, RIGHT URETERAL STENT PLACEMENT, HOLMIUM LASER LITHOTRIPSY (Right) Diagnosis: (KIDNEY STONES)    Surgeons: Robbin Esteban MD Responsible Provider: Shade Garcia MD    Anesthesia Type: general ASA Status: 2            Anesthesia Type: general    Vitals Value Taken Time   /84 06/24/24 1817   Temp 98 °F (36.7 °C) 06/24/24 1732   Pulse 78 06/24/24 1819   Resp 18 06/24/24 1815   SpO2 99 % 06/24/24 1819   Vitals shown include unfiled device data.    Patient Location: PACU    Anesthesia Type: general    Airway Patency: patent and extubated    Postop Pain Control: adequate    Mental Status: preanesthetic baseline    Nausea/Vomiting: none    Cardiopulmonary/Hydration status: stable euvolemic    Complications: no apparent anesthesia related complications    Postop vital signs: stable    Dental Exam: Unchanged from Preop    Patient to be discharged from PACU when criteria met.

## 2024-06-26 VITALS
DIASTOLIC BLOOD PRESSURE: 66 MMHG | OXYGEN SATURATION: 95 % | TEMPERATURE: 98 F | HEART RATE: 72 BPM | HEIGHT: 64 IN | RESPIRATION RATE: 18 BRPM | BODY MASS INDEX: 31.92 KG/M2 | WEIGHT: 187 LBS | SYSTOLIC BLOOD PRESSURE: 123 MMHG

## 2024-06-26 LAB
ALBUMIN SERPL-MCNC: 3.2 G/DL (ref 3.4–5)
ALBUMIN/GLOB SERPL: 0.9 {RATIO} (ref 1–2)
ALP LIVER SERPL-CCNC: 110 U/L
ALT SERPL-CCNC: 28 U/L
ANION GAP SERPL CALC-SCNC: 5 MMOL/L (ref 0–18)
AST SERPL-CCNC: 26 U/L (ref 15–37)
BASOPHILS # BLD AUTO: 0.04 X10(3) UL (ref 0–0.2)
BASOPHILS NFR BLD AUTO: 0.3 %
BILIRUB SERPL-MCNC: 1 MG/DL (ref 0.1–2)
BILIRUB UR QL STRIP.AUTO: NEGATIVE
BUN BLD-MCNC: 14 MG/DL (ref 9–23)
CALCIUM BLD-MCNC: 9.5 MG/DL (ref 8.5–10.1)
CHLORIDE SERPL-SCNC: 106 MMOL/L (ref 98–112)
CLARITY UR REFRACT.AUTO: CLEAR
CO2 SERPL-SCNC: 26 MMOL/L (ref 21–32)
COLOR UR AUTO: YELLOW
CREAT BLD-MCNC: 1.14 MG/DL
EGFRCR SERPLBLD CKD-EPI 2021: 49 ML/MIN/1.73M2 (ref 60–?)
EOSINOPHIL # BLD AUTO: 0.04 X10(3) UL (ref 0–0.7)
EOSINOPHIL NFR BLD AUTO: 0.3 %
ERYTHROCYTE [DISTWIDTH] IN BLOOD BY AUTOMATED COUNT: 13.9 %
GLOBULIN PLAS-MCNC: 3.5 G/DL (ref 2.8–4.4)
GLUCOSE BLD-MCNC: 116 MG/DL (ref 70–99)
GLUCOSE UR STRIP.AUTO-MCNC: NEGATIVE MG/DL
HCT VFR BLD AUTO: 42.4 %
HGB BLD-MCNC: 14 G/DL
IMM GRANULOCYTES # BLD AUTO: 0.34 X10(3) UL (ref 0–1)
IMM GRANULOCYTES NFR BLD: 2.5 %
KETONES UR STRIP.AUTO-MCNC: NEGATIVE MG/DL
LACTATE SERPL-SCNC: 1.3 MMOL/L (ref 0.4–2)
LYMPHOCYTES # BLD AUTO: 1.33 X10(3) UL (ref 1–4)
LYMPHOCYTES NFR BLD AUTO: 9.7 %
MCH RBC QN AUTO: 29.2 PG (ref 26–34)
MCHC RBC AUTO-ENTMCNC: 33 G/DL (ref 31–37)
MCV RBC AUTO: 88.5 FL
MONOCYTES # BLD AUTO: 1.66 X10(3) UL (ref 0.1–1)
MONOCYTES NFR BLD AUTO: 12.1 %
NEUTROPHILS # BLD AUTO: 10.29 X10 (3) UL (ref 1.5–7.7)
NEUTROPHILS # BLD AUTO: 10.29 X10(3) UL (ref 1.5–7.7)
NEUTROPHILS NFR BLD AUTO: 75.1 %
NITRITE UR QL STRIP.AUTO: POSITIVE
OSMOLALITY SERPL CALC.SUM OF ELEC: 285 MOSM/KG (ref 275–295)
PH UR STRIP.AUTO: 5.5 [PH] (ref 5–8)
PLATELET # BLD AUTO: 217 10(3)UL (ref 150–450)
POCT INFLUENZA A: NEGATIVE
POCT INFLUENZA B: NEGATIVE
POTASSIUM SERPL-SCNC: 3.6 MMOL/L (ref 3.5–5.1)
PROT SERPL-MCNC: 6.7 G/DL (ref 6.4–8.2)
RBC # BLD AUTO: 4.79 X10(6)UL
RBC #/AREA URNS AUTO: >10 /HPF
RBC #/AREA URNS AUTO: >10 /HPF
SARS-COV-2 RNA RESP QL NAA+PROBE: NOT DETECTED
SODIUM SERPL-SCNC: 137 MMOL/L (ref 136–145)
SP GR UR STRIP.AUTO: 1.01 (ref 1–1.03)
UROBILINOGEN UR STRIP.AUTO-MCNC: 0.2 MG/DL
WBC # BLD AUTO: 13.7 X10(3) UL (ref 4–11)
WBC #/AREA URNS AUTO: >50 /HPF
WBC #/AREA URNS AUTO: >50 /HPF

## 2024-06-26 PROCEDURE — 87040 BLOOD CULTURE FOR BACTERIA: CPT | Performed by: EMERGENCY MEDICINE

## 2024-06-26 PROCEDURE — 83605 ASSAY OF LACTIC ACID: CPT | Performed by: EMERGENCY MEDICINE

## 2024-06-26 PROCEDURE — 81015 MICROSCOPIC EXAM OF URINE: CPT | Performed by: EMERGENCY MEDICINE

## 2024-06-26 PROCEDURE — 81001 URINALYSIS AUTO W/SCOPE: CPT | Performed by: EMERGENCY MEDICINE

## 2024-06-26 PROCEDURE — 80053 COMPREHEN METABOLIC PANEL: CPT | Performed by: EMERGENCY MEDICINE

## 2024-06-26 PROCEDURE — 87086 URINE CULTURE/COLONY COUNT: CPT | Performed by: EMERGENCY MEDICINE

## 2024-06-26 PROCEDURE — 85025 COMPLETE CBC W/AUTO DIFF WBC: CPT | Performed by: EMERGENCY MEDICINE

## 2024-06-26 PROCEDURE — 87502 INFLUENZA DNA AMP PROBE: CPT | Performed by: EMERGENCY MEDICINE

## 2024-06-26 RX ORDER — CIPROFLOXACIN 250 MG/1
250 TABLET, FILM COATED ORAL 2 TIMES DAILY
Qty: 18 TABLET | Refills: 0 | Status: SHIPPED | OUTPATIENT
Start: 2024-06-26 | End: 2024-06-29

## 2024-06-26 RX ORDER — LEVOFLOXACIN 750 MG/1
750 TABLET, FILM COATED ORAL DAILY
Qty: 9 TABLET | Refills: 0 | Status: SHIPPED | OUTPATIENT
Start: 2024-06-26 | End: 2024-06-26

## 2024-06-26 RX ORDER — LEVOFLOXACIN 500 MG/1
500 TABLET, FILM COATED ORAL ONCE
Status: COMPLETED | OUTPATIENT
Start: 2024-06-26 | End: 2024-06-26

## 2024-06-26 NOTE — ED PROVIDER NOTES
Patient Seen in: [facility] In [place]      History     Chief Complaint   Patient presents with    Fever     Stated Complaint: temp of 101 tonight, lithotripsy done yesterday. was told to come in for elevat*    Subjective:   HPI    Patient is a 78-year-old female presents emergency room for evaluation of a fever.  Patient lithotripsy and stent placed yesterday.  Patient states she developed a fever tonight to 101 degrees.  She took some Motrin.  Fever was at 8 PM.  Patient denies any headache, sore throat, runny nose, abdominal pain, flank pain, nausea, vomiting, diarrhea or urinary symptoms.  She denies any coughing.    Objective:   Past Medical History:    Anesthesia complication    TAKES MORE MEDICATION TO PUT UNDER    Calculus of kidney    CKD (chronic kidney disease)    coronary artery disease    angiogram-minimal disease  [facility]    Disorder of thyroid    Diverticulosis    Esophageal reflux    Fibroids    Hearing impairment    High cholesterol    Hip problem    right hip , unsure of problem    Hx of motion sickness    hyperlipidemia    Osteopenia              Past Surgical History:   Procedure Laterality Date      ,,    Cholecystectomy      Colonoscopy      polyps  [facility]    Colonoscopy  2011    diverticlitis, [facility]    Other surgical history  2011    laparoscopy hemicolectomy  [facility]                Social History     Socioeconomic History    Marital status:    Tobacco Use    Smoking status: Former     Current packs/day: 0.00     Types: Cigarettes     Quit date: 1972     Years since quittin.5    Smokeless tobacco: Never   Vaping Use    Vaping status: Never Used   Substance and Sexual Activity    Alcohol use: Yes     Comment: minimally    Drug use: No    Sexual activity: Yes     Partners: Male   Other Topics Concern    Exercise Yes   Social History Narrative    Daughter is an [facility] RN     Social Determinants of Health      Received from  [facility] Social Needs Screening - Social Connection              Review of Systems    Positive for stated Chief Complaint: Fever    Other systems are as noted in HPI.  Constitutional and vital signs reviewed.      All other systems reviewed and negative except as noted above.    Physical Exam     ED Triage Vitals [06/25/24 2314]   /70   Pulse 85   Resp 16   Temp 98.4 °F (36.9 °C)   Temp src Oral   SpO2 95 %   O2 Device None (Room air)       Current Vitals:   Vital Signs  BP: 123/66  Pulse: 72  Resp: 18  Temp: 98.2 °F (36.8 °C)  Temp src: Oral    Oxygen Therapy  SpO2: 95 %  O2 Device: None (Room air)            Physical Exam    GENERAL: No acute distress, well appearing and non-toxic, Alert and oriented X 3   HEENT: Normocephalic, atraumatic.  Moist mucous membranes.  Pupils equal round reactive to light and accommodation, extraocular motion is intact, sclerae white, conjunctiva is pink.  Oropharynx is unremarkable, no exudate.  NECK: Supple, trachea midline, no lymphadenopathy.   LUNG: Lungs clear to auscultation bilaterally, no wheezing, no rales, no rhonchi.  CARDIOVASCULAR: Regular rate and rhythm.  Normal S1S2.  No S3S4 or murmur.  ABDOMEN: Bowel sounds are present. Soft. nondistended, no pulsatile masses. nontender  MUSCULOSKELETAL: No calf tenderness.  Dorsalis and Posterior Tibial pulses present. No clubbing. No cyanosis.  No edema.   SKIN EXAMINATIoN: Warm and dry with normal appearance.  No rashes or lesions.  NEUROLOGICAL:  Motor strength intact all groups.  normal sensation, speech intact    ED Course     Labs Reviewed   COMP METABOLIC PANEL (14) - Abnormal; Notable for the following components:       Result Value    Glucose 116 (*)     Creatinine 1.14 (*)     eGFR-Cr 49 (*)     Albumin 3.2 (*)     A/G Ratio 0.9 (*)     All other components within normal limits   URINALYSIS WITH CULTURE REFLEX - Abnormal; Notable for the following components:    Blood Urine Large (*)     Protein  Urine 100 mg/dL (*)     Nitrite Urine Positive (*)     Leukocyte Esterase Urine Large (*)     WBC Urine >50 (*)     RBC Urine >10 (*)     Bacteria Urine 1+ (*)     Squamous Epi. Cells Few (*)     All other components within normal limits   UA MICROSCOPIC ONLY, URINE - Abnormal; Notable for the following components:    WBC Urine >50 (*)     RBC Urine >10 (*)     Bacteria Urine 1+ (*)     Squamous Epi. Cells Few (*)     All other components within normal limits   CBC W/ DIFFERENTIAL - Abnormal; Notable for the following components:    WBC 13.7 (*)     Neutrophil Absolute Prelim 10.29 (*)     Neutrophil Absolute 10.29 (*)     Monocyte Absolute 1.66 (*)     All other components within normal limits   LACTIC ACID, PLASMA - Normal   RAPID SARS-COV-2 BY PCR - Normal   POCT FLU TEST - Normal    Narrative:     This assay is a rapid molecular in vitro test utilizing nucleic acid amplification of influenza A and B viral RNA.   CBC WITH DIFFERENTIAL WITH PLATELET    Narrative:     The following orders were created for panel order CBC With Differential With Platelet.  Procedure                               Abnormality         Status                     ---------                               -----------         ------                     CBC W/ DIFFERENTIAL[[id number]]          Abnormal            Final result                 Please view results for these tests on the individual orders.   SCAN SLIDE   BLOOD CULTURE   BLOOD CULTURE   URINE CULTURE, ROUTINE   Creatinine 1.14.  White blood cell count 13.7.          Medications   levoFLOXacin (Levaquin) tab 500 mg (500 mg Oral Given 6/26/24 0127)              MDM      Patient is a 78-year-old female presents to emergency room for evaluation of fever status post stent and lithotripsy.  Differential includes bacteremia, COVID, flu, urinary tract infection.  Patient has a soft and nontender abdomen.  Abdominal imaging not indicated.  COVID and flu testing negative.  Blood cultures  obtained which are pending.  Normal lactic acid.  White blood cell count slightly elevated at 13.7.  Urinalysis shows positive nitrites and leukocyte Esterace which could be infection or due to the stent that is in place.  She has been on Macrodantin at home since yesterday.  Spoke with urology, Dr. Flores.  We discussed case and I explained to him that she appeared clinically well.  She had no fever here.  I believe she is stable for outpatient oral antibiotic treatment and will change the Macrodantin to brandon quinolone.  She was given first dose of antibiotics here.  Urologist advised her to call urology office in the morning for follow-up later this week.  Stop Macrodantin.  Cipro twice daily for 10 days.    Patient was screened and evaluated during this visit.   As a treating physician attending to the patient, I determined, within reasonable clinical confidence and prior to discharge, that an emergency medical condition was not or was no longer present.  There was no indication for further evaluation, treatment or admission on an emergency basis.  Comprehensive verbal and written discharge and follow-up instructions were provided to help prevent relapse or worsening.  Patient was instructed to follow-up with her primary care provider for further evaluation and treatment, but to return immediately to the ER for worsening, concerning, new, changing or persisting symptoms.  I discussed the case with the patient and they had no questions, complaints, or concerns.  Patient felt comfortable going home.                                       MDM    Disposition and Plan     Clinical Impression:  1. Fever, unspecified fever cause         Disposition:  Discharge  6/26/2024  1:23 am    Follow-up:  Robbin Esteban MD  [address] [place] [id number]  [phone]    Follow up in 2 day(s)            Medications Prescribed:  Discharge Medication List as of 6/26/2024  1:44 AM        START taking these  medications    Details   ciprofloxacin 250 MG Oral Tab Take 1 tablet (250 mg total) by mouth 2 (two) times daily for 9 days., Normal, Disp-18 tablet, R-0

## 2024-06-27 ENCOUNTER — APPOINTMENT (OUTPATIENT)
Dept: CT IMAGING | Facility: HOSPITAL | Age: 79
End: 2024-06-27
Attending: EMERGENCY MEDICINE
Payer: MEDICARE

## 2024-06-27 ENCOUNTER — HOSPITAL ENCOUNTER (INPATIENT)
Facility: HOSPITAL | Age: 79
LOS: 1 days | Discharge: HOME | End: 2024-06-29
Attending: EMERGENCY MEDICINE | Admitting: HOSPITALIST
Payer: MEDICARE

## 2024-06-27 ENCOUNTER — APPOINTMENT (OUTPATIENT)
Dept: GENERAL RADIOLOGY | Facility: HOSPITAL | Age: 79
End: 2024-06-27
Payer: MEDICARE

## 2024-06-27 DIAGNOSIS — A41.9 SEPSIS DUE TO URINARY TRACT INFECTION (HCC): Primary | ICD-10-CM

## 2024-06-27 DIAGNOSIS — N39.0 SEPSIS DUE TO URINARY TRACT INFECTION (HCC): Primary | ICD-10-CM

## 2024-06-27 LAB
ALBUMIN SERPL-MCNC: 3.2 G/DL (ref 3.4–5)
ALBUMIN/GLOB SERPL: 0.8 {RATIO} (ref 1–2)
ALP LIVER SERPL-CCNC: 242 U/L
ALT SERPL-CCNC: 33 U/L
ANION GAP SERPL CALC-SCNC: 8 MMOL/L (ref 0–18)
AST SERPL-CCNC: 25 U/L (ref 15–37)
BASOPHILS # BLD AUTO: 0.03 X10(3) UL (ref 0–0.2)
BASOPHILS NFR BLD AUTO: 0.3 %
BILIRUB SERPL-MCNC: 1.3 MG/DL (ref 0.1–2)
BILIRUB UR QL CFM: NEGATIVE
BUN BLD-MCNC: 15 MG/DL (ref 9–23)
CALCIUM BLD-MCNC: 9.2 MG/DL (ref 8.5–10.1)
CHLORIDE SERPL-SCNC: 106 MMOL/L (ref 98–112)
CO2 SERPL-SCNC: 24 MMOL/L (ref 21–32)
CREAT BLD-MCNC: 0.94 MG/DL
EGFRCR SERPLBLD CKD-EPI 2021: 62 ML/MIN/1.73M2 (ref 60–?)
EOSINOPHIL # BLD AUTO: 0.01 X10(3) UL (ref 0–0.7)
EOSINOPHIL NFR BLD AUTO: 0.1 %
ERYTHROCYTE [DISTWIDTH] IN BLOOD BY AUTOMATED COUNT: 13.1 %
GLOBULIN PLAS-MCNC: 4.1 G/DL (ref 2.8–4.4)
GLUCOSE BLD-MCNC: 112 MG/DL (ref 70–99)
GLUCOSE UR STRIP.AUTO-MCNC: NORMAL MG/DL
HCT VFR BLD AUTO: 42.7 %
HGB BLD-MCNC: 14.8 G/DL
IMM GRANULOCYTES # BLD AUTO: 0.06 X10(3) UL (ref 0–1)
IMM GRANULOCYTES NFR BLD: 0.6 %
KETONES UR STRIP.AUTO-MCNC: 10 MG/DL
LACTATE SERPL-SCNC: 1.1 MMOL/L (ref 0.4–2)
LEUKOCYTE ESTERASE UR QL STRIP.AUTO: 500
LYMPHOCYTES # BLD AUTO: 0.83 X10(3) UL (ref 1–4)
LYMPHOCYTES NFR BLD AUTO: 7.9 %
MCH RBC QN AUTO: 29 PG (ref 26–34)
MCHC RBC AUTO-ENTMCNC: 34.7 G/DL (ref 31–37)
MCV RBC AUTO: 83.7 FL
MONOCYTES # BLD AUTO: 1.14 X10(3) UL (ref 0.1–1)
MONOCYTES NFR BLD AUTO: 10.8 %
NEUTROPHILS # BLD AUTO: 8.45 X10 (3) UL (ref 1.5–7.7)
NEUTROPHILS # BLD AUTO: 8.45 X10(3) UL (ref 1.5–7.7)
NEUTROPHILS NFR BLD AUTO: 80.3 %
OSMOLALITY SERPL CALC.SUM OF ELEC: 288 MOSM/KG (ref 275–295)
PH UR STRIP.AUTO: 6.5 [PH] (ref 5–8)
PLATELET # BLD AUTO: 244 10(3)UL (ref 150–450)
PLATELETS.RETICULATED NFR BLD AUTO: 2.3 % (ref 0–7)
POTASSIUM SERPL-SCNC: 3.5 MMOL/L (ref 3.5–5.1)
PROT SERPL-MCNC: 7.3 G/DL (ref 6.4–8.2)
PROT UR STRIP.AUTO-MCNC: 100 MG/DL
RBC # BLD AUTO: 5.1 X10(6)UL
RBC #/AREA URNS AUTO: >10 /HPF
SODIUM SERPL-SCNC: 138 MMOL/L (ref 136–145)
SP GR UR STRIP.AUTO: 1.02 (ref 1–1.03)
UROBILINOGEN UR STRIP.AUTO-MCNC: 6 MG/DL
WBC # BLD AUTO: 10.5 X10(3) UL (ref 4–11)
WBC #/AREA URNS AUTO: >50 /HPF

## 2024-06-27 PROCEDURE — 80053 COMPREHEN METABOLIC PANEL: CPT | Performed by: EMERGENCY MEDICINE

## 2024-06-27 PROCEDURE — 87040 BLOOD CULTURE FOR BACTERIA: CPT | Performed by: EMERGENCY MEDICINE

## 2024-06-27 PROCEDURE — 74177 CT ABD & PELVIS W/CONTRAST: CPT | Performed by: EMERGENCY MEDICINE

## 2024-06-27 PROCEDURE — 99285 EMERGENCY DEPT VISIT HI MDM: CPT

## 2024-06-27 PROCEDURE — 36415 COLL VENOUS BLD VENIPUNCTURE: CPT

## 2024-06-27 PROCEDURE — 85025 COMPLETE CBC W/AUTO DIFF WBC: CPT | Performed by: EMERGENCY MEDICINE

## 2024-06-27 PROCEDURE — 96365 THER/PROPH/DIAG IV INF INIT: CPT

## 2024-06-27 PROCEDURE — 85025 COMPLETE CBC W/AUTO DIFF WBC: CPT

## 2024-06-27 PROCEDURE — 81001 URINALYSIS AUTO W/SCOPE: CPT

## 2024-06-27 PROCEDURE — 83605 ASSAY OF LACTIC ACID: CPT

## 2024-06-27 PROCEDURE — 87086 URINE CULTURE/COLONY COUNT: CPT | Performed by: EMERGENCY MEDICINE

## 2024-06-27 PROCEDURE — 71045 X-RAY EXAM CHEST 1 VIEW: CPT

## 2024-06-27 PROCEDURE — 83605 ASSAY OF LACTIC ACID: CPT | Performed by: EMERGENCY MEDICINE

## 2024-06-27 PROCEDURE — 80053 COMPREHEN METABOLIC PANEL: CPT

## 2024-06-27 PROCEDURE — 81001 URINALYSIS AUTO W/SCOPE: CPT | Performed by: EMERGENCY MEDICINE

## 2024-06-28 LAB
ANION GAP SERPL CALC-SCNC: 7 MMOL/L (ref 0–18)
BASOPHILS # BLD AUTO: 0.02 X10(3) UL (ref 0–0.2)
BASOPHILS NFR BLD AUTO: 0.3 %
BUN BLD-MCNC: 14 MG/DL (ref 9–23)
CALCIUM BLD-MCNC: 8.6 MG/DL (ref 8.5–10.1)
CHLORIDE SERPL-SCNC: 108 MMOL/L (ref 98–112)
CO2 SERPL-SCNC: 23 MMOL/L (ref 21–32)
CREAT BLD-MCNC: 0.89 MG/DL
EGFRCR SERPLBLD CKD-EPI 2021: 66 ML/MIN/1.73M2 (ref 60–?)
EOSINOPHIL # BLD AUTO: 0 X10(3) UL (ref 0–0.7)
EOSINOPHIL NFR BLD AUTO: 0 %
ERYTHROCYTE [DISTWIDTH] IN BLOOD BY AUTOMATED COUNT: 13.2 %
GLUCOSE BLD-MCNC: 101 MG/DL (ref 70–99)
HCT VFR BLD AUTO: 38.5 %
HGB BLD-MCNC: 13 G/DL
IMM GRANULOCYTES # BLD AUTO: 0.05 X10(3) UL (ref 0–1)
IMM GRANULOCYTES NFR BLD: 0.6 %
LYMPHOCYTES # BLD AUTO: 1.03 X10(3) UL (ref 1–4)
LYMPHOCYTES NFR BLD AUTO: 12.9 %
MCH RBC QN AUTO: 29.3 PG (ref 26–34)
MCHC RBC AUTO-ENTMCNC: 33.8 G/DL (ref 31–37)
MCV RBC AUTO: 86.7 FL
MONOCYTES # BLD AUTO: 1.3 X10(3) UL (ref 0.1–1)
MONOCYTES NFR BLD AUTO: 16.3 %
NEUTROPHILS # BLD AUTO: 5.58 X10 (3) UL (ref 1.5–7.7)
NEUTROPHILS # BLD AUTO: 5.58 X10(3) UL (ref 1.5–7.7)
NEUTROPHILS NFR BLD AUTO: 69.9 %
OSMOLALITY SERPL CALC.SUM OF ELEC: 287 MOSM/KG (ref 275–295)
PLATELET # BLD AUTO: 219 10(3)UL (ref 150–450)
POTASSIUM SERPL-SCNC: 3.2 MMOL/L (ref 3.5–5.1)
RBC # BLD AUTO: 4.44 X10(6)UL
SODIUM SERPL-SCNC: 138 MMOL/L (ref 136–145)
WBC # BLD AUTO: 8 X10(3) UL (ref 4–11)

## 2024-06-28 PROCEDURE — 80048 BASIC METABOLIC PNL TOTAL CA: CPT | Performed by: INTERNAL MEDICINE

## 2024-06-28 PROCEDURE — 85025 COMPLETE CBC W/AUTO DIFF WBC: CPT | Performed by: INTERNAL MEDICINE

## 2024-06-28 RX ORDER — SODIUM CHLORIDE 9 MG/ML
INJECTION, SOLUTION INTRAVENOUS CONTINUOUS
Status: DISCONTINUED | OUTPATIENT
Start: 2024-06-28 | End: 2024-06-29

## 2024-06-28 RX ORDER — POLYETHYLENE GLYCOL 3350 17 G/17G
17 POWDER, FOR SOLUTION ORAL DAILY PRN
Status: DISCONTINUED | OUTPATIENT
Start: 2024-06-28 | End: 2024-06-29

## 2024-06-28 RX ORDER — ACETAMINOPHEN 500 MG
500 TABLET ORAL EVERY 4 HOURS PRN
Status: DISCONTINUED | OUTPATIENT
Start: 2024-06-28 | End: 2024-06-28

## 2024-06-28 RX ORDER — MELATONIN
3 NIGHTLY PRN
Status: DISCONTINUED | OUTPATIENT
Start: 2024-06-28 | End: 2024-06-29

## 2024-06-28 RX ORDER — ACETAMINOPHEN 325 MG/1
650 TABLET ORAL EVERY 6 HOURS PRN
Status: DISCONTINUED | OUTPATIENT
Start: 2024-06-28 | End: 2024-06-29

## 2024-06-28 RX ORDER — POTASSIUM CHLORIDE 20 MEQ/1
40 TABLET, EXTENDED RELEASE ORAL ONCE
Status: COMPLETED | OUTPATIENT
Start: 2024-06-28 | End: 2024-06-28

## 2024-06-28 RX ORDER — BISACODYL 10 MG
10 SUPPOSITORY, RECTAL RECTAL
Status: DISCONTINUED | OUTPATIENT
Start: 2024-06-28 | End: 2024-06-29

## 2024-06-28 RX ORDER — ONDANSETRON 2 MG/ML
4 INJECTION INTRAMUSCULAR; INTRAVENOUS EVERY 6 HOURS PRN
Status: DISCONTINUED | OUTPATIENT
Start: 2024-06-28 | End: 2024-06-28

## 2024-06-28 RX ORDER — HEPARIN SODIUM 5000 [USP'U]/ML
5000 INJECTION, SOLUTION INTRAVENOUS; SUBCUTANEOUS EVERY 8 HOURS SCHEDULED
Status: DISCONTINUED | OUTPATIENT
Start: 2024-06-28 | End: 2024-06-29

## 2024-06-28 RX ORDER — ENEMA 19; 7 G/133ML; G/133ML
1 ENEMA RECTAL ONCE AS NEEDED
Status: DISCONTINUED | OUTPATIENT
Start: 2024-06-28 | End: 2024-06-29

## 2024-06-28 RX ORDER — SENNOSIDES 8.6 MG
17.2 TABLET ORAL NIGHTLY PRN
Status: DISCONTINUED | OUTPATIENT
Start: 2024-06-28 | End: 2024-06-29

## 2024-06-28 RX ORDER — ONDANSETRON 2 MG/ML
4 INJECTION INTRAMUSCULAR; INTRAVENOUS EVERY 6 HOURS PRN
Status: DISCONTINUED | OUTPATIENT
Start: 2024-06-28 | End: 2024-06-29

## 2024-06-28 RX ORDER — POLYETHYLENE GLYCOL 3350 17 G/17G
17 POWDER, FOR SOLUTION ORAL DAILY
COMMUNITY

## 2024-06-28 NOTE — CONSULTS
[facility]  Department of Urology   Consultation Note    Chloe Wall Patient Status:  Inpatient    1945 MRN [id number]   Location [facility] 0SW-A Attending Waqar Escalera MD   Hosp Day # 0 PCP [facility] Morris MD     Reason for Consultation:  Fever  S/P cystoscopy, right RGPG, placement of right ureteral stent, ureteroscopy, stone laser lithotripsy on 24 with Dr. Esteban    History of Present Illness:  Chloe Wall is a a(n) 78 year old female.    Chart reviewed including care everywhere.      Patient is followed by Dr. Esteban.  Had been experiencing intermittent right back/flank pain over the last 1 year and hematuria.  While in [place], she developed significant UTI which almost placed her in the hospital.  CT imaging from [place]   - 15 mm right renal collecting system calculus   - 3 mm left parenchymal kidney calculus     Urine culture 24: 10-49K CFU/mL Enterococcus faecalis, 10-49K CFU/mL Corynebacterium species - may represent colonizers  E.faecalis                            ----------------                             INT   SHARMAINE      AMPICILLIN             S     <=2     NITROFURANTOIN         S     <=16     VANCOMYCIN             S     2     S=Susceptible  I=Intermediate  R=Resistant     Rx Macrobid 100 mg BID x 7 days on 24    Underwent cystoscopy, right RGPG, placement of right ureteral stent, ureteroscopy, stone laser lithotripsy on 24 with Dr. Esteban.  Rx: Macrobic 100 mg BID x 7 days post-op    Presented to ER on 24 with fever  Labs 24:  Lactic acid 1.3  WBC 13.7  Hgb 14  Platelet count 217  Serum creat 1.14  UA 24: nitrite positive, >50 WBC/hpf, >10 RBC./hpf, 1+ bacteria, few squamous epithelial cells  Urine culture 24: no growth (on macrobid)  Blood culture 24: prelim no growth after 2 days  Blood culture 24: pending  She was discharged with Cipro    Patient returned to ER with fever. Tmax at  home 102.8.  +chills.  No nausea/vomiting.  No dysuria.  Unsure about hematuria -taking pyridium.  +urinary frequency.  No abdominal/flank pain.      Labs:  Lactic acid 1.1  WBC 10.5 > 8  Hgb 14.8 > 13  Platelet count 244 > 219  Serum creat 0.94 > 0.89    UA 24: >50 WBC/hpf, >10 RBC/hpf, no bacteria, few squamous epithelial cells  Urine culture 24: pending  2/2 blood cultures 24: pending    Abdominal/pelvic CT scan with contrast 24:  Right ureteral stent identified in good position.  There is mild right-sided hydronephrosis.  There are numerous right intrarenal calcifications and a few calcifications adjacent to the stent in the pelvic ureter.  There is a focus of pyelonephritis   with ascending ureteritis and cystitis noted     Urology was consulted.    History:  Past Medical History:    Anesthesia complication    TAKES MORE MEDICATION TO PUT UNDER    Calculus of kidney    CKD (chronic kidney disease)    coronary artery disease    angiogram-minimal disease  [facility]    Disorder of thyroid    Diverticulosis    Esophageal reflux    Fibroids    Hearing impairment    High cholesterol    Hip problem    right hip , unsure of problem    Hx of motion sickness    hyperlipidemia    Osteoarthritis    Osteopenia     Past Surgical History:   Procedure Laterality Date      ,,    Cholecystectomy      Colonoscopy      polyps  [facility]    Colonoscopy      diverticlitis, [facility]    Other surgical history      laparoscopy hemicolectomy  [facility]     Family History   Problem Relation Age of Onset    Heart Disorder Father         CAD    Other Mother         staph infection, RA    Heart Disorder Paternal Grandmother     Heart Disorder Paternal Grandfather     Other Brother         TIA      reports that she quit smoking about 52 years ago. Her smoking use included cigarettes. She has never used smokeless tobacco. She reports current alcohol use. She reports that she does not use  drugs.    Allergies:  Allergies   Allergen Reactions    Pcn [Penicillins] UNKNOWN     As a child    Sulfa Antibiotics RASH       Medications:    Current Facility-Administered Medications:     acetaminophen (Tylenol) tab 650 mg, 650 mg, Oral, Q6H PRN    ondansetron (Zofran) 4 MG/2ML injection 4 mg, 4 mg, Intravenous, Q6H PRN    cefTRIAXone (Rocephin) 1 g in sodium chloride 0.9% 100 mL IVPB-ADDV, 1 g, Intravenous, Q24H    heparin (Porcine) 5000 UNIT/ML injection 5,000 Units, 5,000 Units, Subcutaneous, Q8H SRAVANTHI    sodium chloride 0.9% infusion, , Intravenous, Continuous    potassium chloride (Klor-Con M20) tab 40 mEq, 40 mEq, Oral, Once    Review of Systems:  Pertinent items are noted in HPI.  10 point review of systems completed.    Physical Exam:  /66 (BP Location: Right arm)   Pulse 77   Temp 98.5 °F (36.9 °C) (Oral)   Resp 16   Ht 5' 4\" (1.626 m)   Wt 187 lb 12.8 oz (85.2 kg)   SpO2 95%   BMI 32.24 kg/m²   GENERAL: the patient is resting in bed in no acute distress.    HEENT: Unremarkable.  NECK: Supple.   LUNGS: non-labored respirations.    ABDOMEN:  The abdomen is soft and nontender without rebound or guarding.  The bladder is non-palpable.    BACK: Without CVA tenderness.   SKIN: Without stigmata.   NEUROLOGIC: Grossly intact.  EXTREMITIES: Without edema.    Laboratory Data:  Lab Results   Component Value Date    WBC 8.0 06/28/2024    HGB 13.0 06/28/2024    HCT 38.5 06/28/2024    .0 06/28/2024    CREATSERUM 0.89 06/28/2024    BUN 14 06/28/2024     06/28/2024    K 3.2 06/28/2024     06/28/2024    CO2 23.0 06/28/2024     06/28/2024    CA 8.6 06/28/2024    ALB 3.2 06/27/2024    ALKPHO 242 06/27/2024    BILT 1.3 06/27/2024    TP 7.3 06/27/2024    AST 25 06/27/2024    ALT 33 06/27/2024     Urine culture 6/6/24: 10-49K CFU/mL Enterococcus faecalis, 10-49K CFU/mL Corynebacterium species - may represent colonizers    Urine culture 6/26/24: no growth (on macrobid)  Blood culture  6/26/24: prelim no growth after 2 days  Blood culture 6/26/24: pending     Urine culture 6/27/24: pending  2/2 blood cultures 6/27/24: pending    Imaging:  CT ABDOMEN+PELVIS(CONTRAST ONLY)(CPT=74177)    Result Date: 6/27/2024  CONCLUSION:  1. Right ureteral stent identified in good position.  There is mild right-sided hydronephrosis.  There are numerous right intrarenal calcifications and a few calcifications adjacent to the stent in the pelvic ureter.  There is a focus of pyelonephritis with ascending ureteritis and cystitis noted. 2. Mild ileus.  Small hiatal hernia. Numeral 3. 5.2 cm fibroid within a retroverted uterus.    LOCATION:  [facility]   Dictated by (CST): Stone Garcia MD on 6/27/2024 at 10:21 PM     Finalized by (CST): Stone Garcia MD on 6/27/2024 at 10:41 PM       XR CHEST AP PORTABLE  (CPT=71045)    Result Date: 6/27/2024  CONCLUSION:  No evidence of active cardiopulmonary disease.   LOCATION:  [id number]      Dictated by (CST): Tyrone Irene MD on 6/27/2024 at 8:59 PM     Finalized by (CST): Tyrone Irene MD on 6/27/2024 at 9:00 PM       Impression:  Patient Active Problem List   Diagnosis    GERD (gastroesophageal reflux disease)    Hypothyroid    Overweight(278.02)    Depression    Coronary artery disease    Osteopenia    Hyperlipidemia, unspecified hyperlipidemia type    Hypothyroidism, unspecified type    Other depression    Gastroesophageal reflux disease without esophagitis    Coronary artery disease involving native coronary artery of native heart without angina pectoris    Aortic atherosclerosis ([facility])    CKD (chronic kidney disease) stage 3, GFR 30-59 ml/min ([facility])    Benign neoplasm of colon    Diverticulitis of colon    Osteoporosis    Urinary tract infection, site not specified    Sepsis due to urinary tract infection ([facility])     RIGHT 15 MM RENAL COLLECTING SYSTEM STONE, LEFT 3 MM RENAL PARENCHYMAL STONE  S/P cystoscopy, right RGPG, placement of right ureteral stent, ureteroscopy, stone  laser lithotripsy on 6/24/24 with Dr. Esteban    ADMITTED WITH FEVER/CHILLS, UTI  Tmax 100 on 6/27, most recent temps 99.9 > 98.5  WBC 13.7 > 10.5 > 8  Serum creat 1.14 > 0.94 > 0.89  Urine culture 6/6/24: 10-49K CFU/mL Enterococcus faecalis, 10-49K CFU/mL Corynebacterium species - may represent colonizers  Urine culture 6/26/24: no growth (on macrobid)  Urine culture 6/27/24: pending  Blood culture 6/26/24: prelim no growth after 2 days  Blood culture 6/26/24: pending  2/2 blood cultures 6/27/24: pending  Abdominal/pelvic CT scan with contrast 6/27/24: Right ureteral stent identified in good position. There is mild right-sided hydronephrosis.  There are numerous right intrarenal calcifications and a few calcifications adjacent to the stent in the pelvic ureter.  There is a focus of pyelonephritis   with ascending ureteritis and cystitis noted.     Recommendations:  Spoke with Dr. Esteban and reviewed above including CT scan results.  Stone was broken up into small stone fragments that should pass on their own.     Check final culture results  ID consulted for abx recommendations  Follow labs, temp    Above discussed with patient, nurse, Dr. Esteban.      Thank you for allowing me to participate in the care of your patient.    Zena Ramirez PA-C  [facility]  Department of Urology  6/28/2024  6:45 AM

## 2024-06-28 NOTE — ED QUICK NOTES
Orders for admission, patient is aware of plan and ready to go upstairs. Any questions, please call ED RN Zenobia at extension [phone].     Patient Covid vaccination status: Fully vaccinated     COVID Test Ordered in ED: None    COVID Suspicion at Admission: N/A    Running Infusions:      Mental Status/LOC at time of transport: A&O x3     Other pertinent information:   CIWA score: N/A   NIH score:  N/A

## 2024-06-28 NOTE — ED PROVIDER NOTES
Patient Seen in: [facility] Emergency Department      History     Chief Complaint   Patient presents with    Fever    Postop/Procedure Problem     Stated Complaint: procedure done MOnday, having fevers, here to r/o sepsis    Subjective:   HPI    78-year-old female present emerged department for fever.  Patient had procedure done on Monday yesterday was in the emergency department switched to ciprofloxacin patient's got a fever prompting the visit here.  Patient still has a stent in place no other exacerbating factors or associated symptoms    Objective:   Past Medical History:    Anesthesia complication    TAKES MORE MEDICATION TO PUT UNDER    Calculus of kidney    CKD (chronic kidney disease)    coronary artery disease    angiogram-minimal disease  [facility]    Disorder of thyroid    Diverticulosis    Esophageal reflux    Fibroids    Hearing impairment    High cholesterol    Hip problem    right hip , unsure of problem    Hx of motion sickness    hyperlipidemia    Osteopenia              Past Surgical History:   Procedure Laterality Date      ,,    Cholecystectomy      Colonoscopy      polyps  [facility]    Colonoscopy  2011    diverticlitis, [facility]    Other surgical history      laparoscopy hemicolectomy  [facility]                Social History     Socioeconomic History    Marital status:    Tobacco Use    Smoking status: Former     Current packs/day: 0.00     Types: Cigarettes     Quit date: 1972     Years since quittin.5    Smokeless tobacco: Never   Vaping Use    Vaping status: Never Used   Substance and Sexual Activity    Alcohol use: Yes     Comment: minimally    Drug use: No    Sexual activity: Yes     Partners: Male   Other Topics Concern    Exercise Yes   Social History Narrative    Daughter is an [place] ICU RN     Social Determinants of Health      Received from [facility] Social Needs Screening - Social Connection              Review of  Systems    Positive for stated Chief Complaint: Fever and Postop/Procedure Problem    Other systems are as noted in HPI.  Constitutional and vital signs reviewed.      All other systems reviewed and negative except as noted above.    Physical Exam     ED Triage Vitals [06/27/24 1933]   /67   Pulse 109   Resp 20   Temp 99.9 °F (37.7 °C)   Temp src Oral   SpO2 96 %   O2 Device None (Room air)       Current Vitals:   Vital Signs  BP: 124/68  Pulse: 99  Resp: 14  Temp: 100 °F (37.8 °C)  Temp src: Oral  MAP (mmHg): 84    Oxygen Therapy  SpO2: 95 %  O2 Device: None (Room air)            Physical Exam    Awake alert patient appears no distress HEENT exam normal lungs are clear cardiovascular exam regular rhythm abdomen soft nontender extremities no COVID cyanosis or edema no rash    ED Course     Labs Reviewed   COMP METABOLIC PANEL (14) - Abnormal; Notable for the following components:       Result Value    Glucose 112 (*)     Alkaline Phosphatase 242 (*)     Albumin 3.2 (*)     A/G Ratio 0.8 (*)     All other components within normal limits   URINALYSIS WITH CULTURE REFLEX - Abnormal; Notable for the following components:    Clarity Urine Turbid (*)     Ketones Urine 10 (*)     Blood Urine 2+ (*)     Protein Urine 100 (*)     Urobilinogen Urine 6 (*)     Nitrite Urine 2+ (*)     Leukocyte Esterase Urine 500 (*)     WBC Urine >50 (*)     RBC Urine >10 (*)     Squamous Epi. Cells Few (*)     All other components within normal limits   CBC W/ DIFFERENTIAL - Abnormal; Notable for the following components:    Neutrophil Absolute Prelim 8.45 (*)     Neutrophil Absolute 8.45 (*)     Lymphocyte Absolute 0.83 (*)     Monocyte Absolute 1.14 (*)     All other components within normal limits   LACTIC ACID, PLASMA - Normal   ICTOTEST - Normal   CBC WITH DIFFERENTIAL WITH PLATELET    Narrative:     The following orders were created for panel order CBC With Differential With Platelet.  Procedure                                Abnormality         Status                     ---------                               -----------         ------                     CBC W/ DIFFERENTIAL[[id number]]          Abnormal            Final result                 Please view results for these tests on the individual orders.   BLOOD CULTURE   BLOOD CULTURE   URINE CULTURE, ROUTINE             Differential diagnosis includes sepsis, UTI         MDM        Admission disposition: 6/27/2024 10:46 PM                                        Medical Decision Making  78-year-old female presents emergency department for possible UTI.  IV established cardiac monitor shows sinus tachycardia pulse ox shows no signs of hypoxia CBC shows normal white cell count metabolic panel stable renal function urinalysis no signs of infection lactic acid shows no elevation to overwhelming sepsis.  Antibiotics ordered.  Independent interpretation by ED physician CT scan shows pyelonephritis.  Patient discussed with urology and the hospitalist patient will be admitted at this time    Problems Addressed:  Sepsis due to urinary tract infection (HCC): acute illness or injury    Amount and/or Complexity of Data Reviewed  Labs: ordered. Decision-making details documented in ED Course.  Radiology: ordered and independent interpretation performed. Decision-making details documented in ED Course.  ECG/medicine tests: ordered and independent interpretation performed. Decision-making details documented in ED Course.        Disposition and Plan     Clinical Impression:  1. Sepsis due to urinary tract infection (HCC)         Disposition:  Admit  6/27/2024 10:46 pm    Follow-up:  No follow-up provider specified.        Medications Prescribed:  Current Discharge Medication List                            Hospital Problems       Present on Admission  Date Reviewed: 6/27/2024            ICD-10-CM Noted POA    * (Principal) Sepsis due to urinary tract infection (HCC) A41.9, N39.0 6/27/2024 Unknown

## 2024-06-28 NOTE — PAYOR COMM NOTE
--------------  ADMISSION REVIEW     Payor: [employer]  Subscriber #:  [id number]  Authorization Number: [id number]    Admit date: 6/28/24  Admit time: 12:43 AM       REVIEW DOCUMENTATION:     ED Provider Notes        Stated Complaint: procedure done MOnday, having fevers, here to r/o sepsis    Subjective:   HPI    78-year-old female present emerged department for fever.  Patient had procedure done on Monday yesterday was in the emergency department switched to ciprofloxacin patient's got a fever prompting the visit here.  Patient still has a stent in place no other exacerbating factors or associated symptoms        Physical Exam     ED Triage Vitals [06/27/24 1933]   /67   Pulse 109   Resp 20   Temp 99.9 °F (37.7 °C)   Temp src Oral   SpO2 96 %   O2 Device None (Room air)       Current Vitals:   Vital Signs  BP: 124/68  Pulse: 99  Resp: 14  Temp: 100 °F (37.8 °C)  Temp src: Oral  MAP (mmHg): 84    Oxygen Therapy  SpO2: 95 %  O2 Device: None (Room air)      Physical Exam    Awake alert patient appears no distress HEENT exam normal lungs are clear cardiovascular exam regular rhythm abdomen soft nontender extremities no COVID cyanosis or edema no rash    ED Course     Labs Reviewed   COMP METABOLIC PANEL (14) - Abnormal; Notable for the following components:       Result Value    Glucose 112 (*)     Alkaline Phosphatase 242 (*)     Albumin 3.2 (*)     A/G Ratio 0.8 (*)     All other components within normal limits   URINALYSIS WITH CULTURE REFLEX - Abnormal; Notable for the following components:    Clarity Urine Turbid (*)     Ketones Urine 10 (*)     Blood Urine 2+ (*)     Protein Urine 100 (*)     Urobilinogen Urine 6 (*)     Nitrite Urine 2+ (*)     Leukocyte Esterase Urine 500 (*)     WBC Urine >50 (*)     RBC Urine >10 (*)     Squamous Epi. Cells Few (*)     All other components within normal limits   CBC W/ DIFFERENTIAL - Abnormal; Notable for the following components:    Neutrophil Absolute Prelim  8.45 (*)     Neutrophil Absolute 8.45 (*)     Lymphocyte Absolute 0.83 (*)     Monocyte Absolute 1.14 (*)     All other components within normal limits      Differential diagnosis includes sepsis, UTI    MDM        Admission disposition: 2024 10:46 PM    Medical Decision Making  78-year-old female presents emergency department for possible UTI.  IV established cardiac monitor shows sinus tachycardia pulse ox shows no signs of hypoxia CBC shows normal white cell count metabolic panel stable renal function urinalysis no signs of infection lactic acid shows no elevation to overwhelming sepsis.  Antibiotics ordered.  Independent interpretation by ED physician CT scan shows pyelonephritis.  Patient discussed with urology and the hospitalist patient will be admitted at this time    Problems Addressed:  Sepsis due to urinary tract infection (HCC): acute illness or injury    Amount and/or Complexity of Data Reviewed  Labs: ordered. Decision-making details documented in ED Course.  Radiology: ordered and independent interpretation performed. Decision-making details documented in ED Course.  ECG/medicine tests: ordered and independent interpretation performed. Decision-making details documented in ED Course.        Disposition and Plan     Clinical Impression:  1. Sepsis due to urinary tract infection (HCC)         Disposition:  Admit  2024 10:46 pm        Hospital Problems       Present on Admission  Date Reviewed: 2024            ICD-10-CM Noted POA    * (Principal) Sepsis due to urinary tract infection (HCC) A41.9, N39.0 2024 Unknown         History and Physical           Chloe BILLIE Pacheconatalia Patient Status:  Inpatient    1945 MRN [id number]   Location [facility] 0SW-A Attending Waqar Escalera MD   Hosp Day # 0 PCP [facility] MD      Admit Date:  2024     Is this a shared or split note between Advanced Practice Provider and Physician? Yes     ASSESSMENT / PLAN:   78 year old female from  home with a PMHx sig for CAD, hypothyroidism, HLD, GERD presents to the hospital with fevers.     Sepsis 2/2 UTI  -s/p lithotripsy w stent placement 6/24, started to have persistent fevers (101) the day after procedure, was on cipro after ED visit on 6/25 but fever persisted so presented to [facility]  -afebrile, no leukocytosis, blood cxs in process  -UA + for UTI, urine cxs in process, IV rocephin started, ID consulted >> if clinical status deteriorates, will broaden spectrum   -CT ab with right ureteral stent in good position, mild hydro, intrarenal calcifications, pyelonephritis w ascending ureteritis and cystitis, mild ileus   -urology consulted, stone should pass on it's own, await final urine cxs  -hold phenazopyridine per urology      GOC: voluntary GOC discussion with patient confirms she is a full code.     MA/ACO Reach  -Re- Entry: no  -Consults: uro, ID  -Discharge Needs  -Appointments: PCP       [place] connor in am  -IVF  -diet-cardiac     Prophy  -SCD  -heparin     Dispo  -pending clinical course  PCP: [facility] MD        Outpatient records or previous hospital records reviewed.   Further recommendations pending patient's clinical course.  [facility] hospitalist  team to continue to follow patient while in house  Concerns regarding plan of care were discussed with patient. Patient agrees with plan as detailed above. Discussed plan of care with Dr. Jordan     Note: This chart was prepared using voice recognition software and may contain unintended word substitution errors.      Piyush STARKEY  [facility] Hospitalist Team   Available via Perfect Serve or Bubble Chat (check Availability)     6/28/2024              HISTORY:   CC:       Chief Complaint   Patient presents with    Fever    Postop/Procedure Problem         PCP: [facility] MD     History of Present Illness:78 year old female from home with a PMHx sig for CAD, hypothyroidism, HLD, GERD presents to the hospital with fevers.  She had  lithotripsy with right ureter stent placement on 6/24 and began to have fevers the day after. She was prescribed nitrofurantoin 7 days prior to her lithotripsy and for 7 days after for a previous UTI that grew enterococcus.  She continued to have fevers so it was switched to cipro.  Yesterday @ 1500 her temp was 102.8 so came to [facility] for evaluation.  IV abx initiated, ID and uro consulted for evaluation.             6/28  ID:    Date of admission: 6/27/2024  8:38 PM      Date of service: 06/28/24 8:54 AM     Consult requested by: Waqar Escalera MD      Reason for consult: Pyelonephritis     Chief complaint: Fever     History of present illness: Chloe Wall is a 78 year old female with history of nephrolithiasis, back on Monday on 6/24, she had a cystoscopy with lithotripsy and stent placement with urology.  Prior to that, she had a urine culture that grew Enterococcus faecalis and corynebacterium.  She was given nitrofurantoin before the procedure and for 7 days post the procedure.  Despite that, she started having fevers and chills.  She was given a prescription for Cipro; however, continued to have fevers so presented to the emergency room.  In the ED, she was afebrile, hemodynamically stable.  Labs revealed mild leukocytosis white count of 13.7 back on 6/25.  However, leukocytosis resolved when she presented here.  BMP unremarkable.  LFTs unremarkable.  UA showed more than 50 white blood cells and more than 10 RBCs along with a few squamous cells.  Urine cultures and blood cultures are currently pending.  CT of the abdomen pelvis done that showed a right ureteral stent in good position with mild right-sided hydronephrosis, numerous right-sided intrarenal calcifications and a few calcifications adjacent to the stent in the pelvic ureter.  There is a focus of pyelonephritis noted on the right side.  Patient was given ceftriaxone and admitted to the medical floor.  Remains afebrile.       Impression:  Chloe  BILLIE Wall is a 78 year old female with      Complicated right-sided pyelonephritis in the setting of nephrolithiasis, recent cystoscopy with lithotripsy back on Monday, 6/24  Urine culture as an outpatient had grown Enterococcus faecalis and corynebacterium  Currently on IV ceftriaxone  Patient clinically improving despite being on IV ceftriaxone that has no coverage against Enterococcus faecalis  Reported allergy to penicillin  Known allergy.  The patient received penicillin as a child without any reaction.  He was told by her grandmother that she is allergic.     Recommendations:      Continue to follow-up on urine cultures and blood cultures  Continue ceftriaxone for now  If the patient clinical status worsens, then would escalate to IV Zosyn pending culture data  Continue to monitor daily labs for antibiotic toxicity  Further recommendations will depend on the above work-up and clinical progress      The plan of care was discussed with the primary hospital team, Waqar Escalera MD      Thank you for this consultation. Please don't hesitate to call the ID team for questions or any acute changes in patient's clinical condition.      6/28 UROLOGY:    Reason for Consultation:  Fever  S/P cystoscopy, right RGPG, placement of right ureteral stent, ureteroscopy, stone laser lithotripsy on 6/24/24 with Dr. Esteban     History of Present Illness:  Chloe Wall is a a(n) 78 year old female.     Chart reviewed including care everywhere.       Patient is followed by Dr. Esteban.  Had been experiencing intermittent right back/flank pain over the last 1 year and hematuria.  While in [place], she developed significant UTI which almost placed her in the hospital.  CT imaging from [place]   - 15 mm right renal collecting system calculus   - 3 mm left parenchymal kidney calculus      Urine culture 6/6/24: 10-49K CFU/mL Enterococcus faecalis, 10-49K CFU/mL Corynebacterium species - may represent colonizers  E.faecalis        RIGHT 15 MM RENAL COLLECTING SYSTEM STONE, LEFT 3 MM RENAL PARENCHYMAL STONE  S/P cystoscopy, right RGPG, placement of right ureteral stent, ureteroscopy, stone laser lithotripsy on 6/24/24 with Dr. Esteban     ADMITTED WITH FEVER/CHILLS, UTI  Tmax 100 on 6/27, most recent temps 99.9 > 98.5  WBC 13.7 > 10.5 > 8  Serum creat 1.14 > 0.94 > 0.89  Urine culture 6/6/24: 10-49K CFU/mL Enterococcus faecalis, 10-49K CFU/mL Corynebacterium species - may represent colonizers  Urine culture 6/26/24: no growth (on macrobid)  Urine culture 6/27/24: pending  Blood culture 6/26/24: prelim no growth after 2 days  Blood culture 6/26/24: pending  2/2 blood cultures 6/27/24: pending  Abdominal/pelvic CT scan with contrast 6/27/24: Right ureteral stent identified in good position. There is mild right-sided hydronephrosis.  There are numerous right intrarenal calcifications and a few calcifications adjacent to the stent in the pelvic ureter.  There is a focus of pyelonephritis   with ascending ureteritis and cystitis noted.      Recommendations:  Spoke with Dr. Esteban and reviewed above including CT scan results.  Stone was broken up into small stone fragments that should pass on their own.      Check final culture results  ID consulted for abx recommendations  Follow labs, temp     Above discussed with patient, nurse, Dr. Esteban.       Thank you for allowing me to participate in the care of your patient.     Zena Ramirez PA-C    MEDICATIONS ADMINISTERED IN LAST 1 DAY:  acetaminophen (Tylenol) tab 650 mg       Date Action Dose Route User    6/28/2024 0130 Given 650 mg Oral Merlyn Schneider RN          cefTRIAXone (Rocephin) 1 g in sodium chloride 0.9% 100 mL IVPB-ADDV       Date Action Dose Route User    6/27/2024 2114 New Bag 1 g Intravenous Zenobia Flores, RN          heparin (Porcine) 5000 UNIT/ML injection 5,000 Units       Date Action Dose Route User    6/28/2024 0651 Given 5,000 Units Subcutaneous (Left Lower  Abdomen) Merlyn Schneider RN          iopamidol 76% (ISOVUE-370) injection for power injector       Date Action Dose Route User    6/27/2024 2158 Given 100 mL Intravenous Aleksandra Gonzalez          potassium chloride (Klor-Con M20) tab 40 mEq       Date Action Dose Route User    6/28/2024 0651 Given 40 mEq Oral Merlyn Schneider RN          sodium chloride 0.9% infusion       Date Action Dose Route User    6/28/2024 0900 Rate/Dose Verify (none) Intravenous Emiliana Singletary RN    6/28/2024 0104 New Bag (none) Intravenous Chely Grey RN            Vitals (last day)       Date/Time Temp Pulse Resp BP SpO2 Weight O2 Device O2 Flow Rate (L/min) [facility]    06/28/24 0455 98.5 °F (36.9 °C) 77 16 129/66 95 % -- None (Room air) --     06/28/24 0100 99.9 °F (37.7 °C) 93 16 150/73 99 % 187 lb 12.8 oz None (Room air) --     06/27/24 2345 -- 98 16 -- 97 % -- None (Room air) --     06/27/24 2300 -- 98 -- -- 93 % -- None (Room air) --     06/27/24 2245 -- 96 16 120/70 92 % -- None (Room air) --     06/27/24 2130 -- 99 14 124/68 95 % -- None (Room air) --     06/27/24 2121 -- -- -- -- -- -- None (Room air) --     06/27/24 2049 100 °F (37.8 °C) -- -- -- -- -- -- --     06/27/24 2029 -- 110 18 108/72 94 % -- None (Room air) --     06/27/24 1933 99.9 °F (37.7 °C) 109 20 117/67 96 % 187 lb None (Room air) -- BR

## 2024-06-28 NOTE — H&P
[facility]   Hospitalist Team  [facility]   part of [facility]     History and Physical    Chloe Wall Patient Status:  Inpatient    1945 MRN [id number]   Location [facility] 0SW-A Attending Waqar Escalera MD   Hosp Day # 0 PCP [facility] MD     Admit Date:  2024    Is this a shared or split note between Advanced Practice Provider and Physician? Yes    ASSESSMENT / PLAN:   78 year old female from home with a PMHx sig for CAD, hypothyroidism, HLD, GERD presents to the hospital with fevers.    Sepsis 2/2 UTI  -s/p lithotripsy w stent placement , started to have persistent fevers (101) the day after procedure, was on cipro after ED visit on  but fever persisted so presented to [place] ED  -afebrile, however with low-grade fevers of 100, no leukocytosis, blood cxs in process  -UA + for UTI, urine cxs in process, IV rocephin started, ID consulted >> if clinical status deteriorates, will broaden spectrum   -CT ab with right ureteral stent in good position, mild hydro, intrarenal calcifications, pyelonephritis w ascending ureteritis and cystitis, mild ileus   -urology consulted, stone should pass on it's own, await final urine cxs  -hold phenazopyridine per urology   -Continue to follow    # Aortic atherosclerosis  # Major depression-patient stopped taking Wellbutrin about to be 2 years ago  # Hypothyroidism-patient recently stopped taking her Synthroid, will check a TSH at this time  # History of CKD stage III creatinine appears to be at-normal at this time    GOC: voluntary GOC discussion with patient confirms she is a full code.    MA/ACO Reach  -Re- Entry: no  -Consults: uro, ID  -Discharge Needs  -Appointments: PCP      [place]  -keo in am  -IVF  -diet-cardiac    Prophy  -SCD  -heparin    Dispo  -pending clinical course  PCP: [facility] MD       Outpatient records or previous hospital records reviewed.   Further recommendations pending patient's clinical course.   [facility] hospitalist  team to continue to follow patient while in house  Concerns regarding plan of care were discussed with patient. Patient agrees with plan as detailed above. Discussed plan of care with Dr. Jordan    Note: This chart was prepared using voice recognition software and may contain unintended word substitution errors.     Piyush STARKEY  [facility] Hospitalist Team   Available via Perfect Serve or Bubble Chat (check Availability)    6/28/2024               HISTORY:   CC:   Chief Complaint   Patient presents with    Fever    Postop/Procedure Problem        PCP: [facility] MD    History of Present Illness:78 year old female from home with a PMHx sig for CAD, hypothyroidism, HLD, GERD presents to the hospital with fevers.  She had lithotripsy with right ureter stent placement on 6/24 and began to have fevers the day after. She was prescribed nitrofurantoin 7 days prior to her lithotripsy and for 7 days after for a previous UTI that grew enterococcus.  She continued to have fevers so it was switched to cipro.  Yesterday @ 1500 her temp was 102.8 so came to [facility] for evaluation.  IV abx initiated, ID and uro consulted for evaluation.         Review of Systems  12 point systems reviewed, please see HPI for pertinent positives, otherwise negative    OBJECTIVE:  /66 (BP Location: Right arm)   Pulse 77   Temp 98.5 °F (36.9 °C) (Oral)   Resp 16   Ht 5' 4\" (1.626 m)   Wt 187 lb 12.8 oz (85.2 kg)   SpO2 95%   BMI 32.24 kg/m²     GENERAL: no apparent distress  NEUROLOGIC: A/A; Ox3: strength normal; sensations intact  RESPIRATORY: normal expansion; non labored, CTA   CARDIOVASCULAR: regular,nl S1 S2  ABDOMEN:  Soft, BS+; non distended, non tender    EXTREMITIES: no LE edema    PMH  Past Medical History:    Anesthesia complication    TAKES MORE MEDICATION TO PUT UNDER    Calculus of kidney    CKD (chronic kidney disease)    coronary artery disease    angiogram-minimal disease  [place]     Disorder of thyroid    Diverticulosis    Esophageal reflux    Fibroids    Hearing impairment    High cholesterol    Hip problem    right hip , unsure of problem    Hx of motion sickness    hyperlipidemia    Osteoarthritis    Osteopenia        PSH  Past Surgical History:   Procedure Laterality Date      ,,    Cholecystectomy      Colonoscopy  2006    polyps  [facility]    Colonoscopy  2011    diverticlitis, Anshul    Other surgical history  2011    laparoscopy hemicolectomy  [place]        ALL:  Allergies   Allergen Reactions    Pcn [Penicillins] UNKNOWN     As a child    Sulfa Antibiotics RASH        Home Medications:  Outpatient Medications Marked as Taking for the 24 encounter (Hospital Encounter)   Medication Sig Dispense Refill    polyethylene glycol, PEG 3350, 17 g Oral Powd Pack Take 17 g by mouth daily.      aspirin-acetaminophen-caffeine 250-250-65 MG Oral Tab Take 1 tablet by mouth every 6 (six) hours as needed for Pain.      ciprofloxacin 250 MG Oral Tab Take 1 tablet (250 mg total) by mouth 2 (two) times daily for 9 days. 18 tablet 0    phenazopyridine 100 MG Oral Tab Take 1 tablet (100 mg total) by mouth 3 (three) times daily as needed. 15 tablet 1    traMADol 50 MG Oral Tab Take 1-2 tablets ( mg total) by mouth every 6 (six) hours as needed for Pain. 10 tablet 0    TUMS OR Take by mouth as needed (indigestion, heartburn, acid reflux).         Soc Hx  Social History     Tobacco Use    Smoking status: Former     Current packs/day: 0.00     Types: Cigarettes     Quit date: 1972     Years since quittin.5    Smokeless tobacco: Never   Substance Use Topics    Alcohol use: Yes     Comment: minimally 3 times a year        Fam Hx  Family History   Problem Relation Age of Onset    Heart Disorder Father         CAD    Other Mother         staph infection, RA    Heart Disorder Paternal Grandmother     Heart Disorder Paternal Grandfather     Other Brother         TIA                   DIAGNOSTIC DATA:   CBC/Chem  Recent Labs   Lab 06/26/24  0023 06/27/24 2008 06/28/24  0445   WBC 13.7* 10.5 8.0   HGB 14.0 14.8 13.0   MCV 88.5 83.7 86.7   .0 244.0 219.0       Recent Labs   Lab 06/26/24  0023 06/27/24 2008 06/28/24  0445    138 138   K 3.6 3.5 3.2*    106 108   CO2 26.0 24.0 23.0   BUN 14 15 14   CREATSERUM 1.14* 0.94 0.89   * 112* 101*   CA 9.5 9.2 8.6       Recent Labs   Lab 06/26/24  0023 06/27/24 2008   ALT 28 33   AST 26 25   ALB 3.2* 3.2*       No results for input(s): \"TROP\" in the last 168 hours.        Radiology: CT ABDOMEN+PELVIS(CONTRAST ONLY)(CPT=74177)    Result Date: 6/27/2024  CONCLUSION:  1. Right ureteral stent identified in good position.  There is mild right-sided hydronephrosis.  There are numerous right intrarenal calcifications and a few calcifications adjacent to the stent in the pelvic ureter.  There is a focus of pyelonephritis with ascending ureteritis and cystitis noted. 2. Mild ileus.  Small hiatal hernia. Numeral 3. 5.2 cm fibroid within a retroverted uterus.    LOCATION:  [facility]   Dictated by (CST): Stone Garcia MD on 6/27/2024 at 10:21 PM     Finalized by (CST): Stone Garcia MD on 6/27/2024 at 10:41 PM       XR CHEST AP PORTABLE  (CPT=71045)    Result Date: 6/27/2024  CONCLUSION:  No evidence of active cardiopulmonary disease.   LOCATION:  [id number]      Dictated by (CST): Tyrone Irene MD on 6/27/2024 at 8:59 PM     Finalized by (CST): Tyrone Irene MD on 6/27/2024 at 9:00 PM          SEE ATTENDING NOTE BELOW        Patient seen and evaluated by me dependently and agree with above with the following edits    -Sepsis secondary to UTI and in the setting of stent placement  -Was on ciprofloxacin, developed fevers as well as dysuria despite antibiotics  -Currently on Rocephin awaiting urine cultures  -Urology evaluated, stent removal as an outpatient infectious disease on board as well

## 2024-06-28 NOTE — CONSULTS
Infectious Disease Initial Consultation      Date of admission: 6/27/2024  8:38 PM     Date of service: 06/28/24 8:54 AM    Consult requested by: Waqar Escalera MD     Reason for consult: Pyelonephritis    Chief complaint: Fever    History of present illness: Chloe Wall is a 78 year old female with history of nephrolithiasis, back on Monday on 6/24, she had a cystoscopy with lithotripsy and stent placement with urology.  Prior to that, she had a urine culture that grew Enterococcus faecalis and corynebacterium.  She was given nitrofurantoin before the procedure and for 7 days post the procedure.  Despite that, she started having fevers and chills.  She was given a prescription for Cipro; however, continued to have fevers so presented to the emergency room.  In the ED, she was afebrile, hemodynamically stable.  Labs revealed mild leukocytosis white count of 13.7 back on 6/25.  However, leukocytosis resolved when she presented here.  BMP unremarkable.  LFTs unremarkable.  UA showed more than 50 white blood cells and more than 10 RBCs along with a few squamous cells.  Urine cultures and blood cultures are currently pending.  CT of the abdomen pelvis done that showed a right ureteral stent in good position with mild right-sided hydronephrosis, numerous right-sided intrarenal calcifications and a few calcifications adjacent to the stent in the pelvic ureter.  There is a focus of pyelonephritis noted on the right side.  Patient was given ceftriaxone and admitted to the medical floor.  Remains afebrile.    Review of systems:  All other components of the review of systems are negative, except those described in the history of present illness.     Past Medical History:    Anesthesia complication    TAKES MORE MEDICATION TO PUT UNDER    Calculus of kidney    CKD (chronic kidney disease)    coronary artery disease    angiogram-minimal disease  [place]    Disorder of thyroid    Diverticulosis    Esophageal reflux     Fibroids    Hearing impairment    High cholesterol    Hip problem    right hip , unsure of problem    Hx of motion sickness    hyperlipidemia    Osteoarthritis    Osteopenia     Past Surgical History:   Procedure Laterality Date      1966,1968,1970    Cholecystectomy      Colonoscopy  2006    polyps  [place]    Colonoscopy  2011    diverticlitis, [place]    Other surgical history  2011    laparoscopy hemicolectomy  [place]     Social History     Socioeconomic History    Marital status:    Tobacco Use    Smoking status: Former     Current packs/day: 0.00     Types: Cigarettes     Quit date: 1972     Years since quittin.5    Smokeless tobacco: Never   Vaping Use    Vaping status: Never Used   Substance and Sexual Activity    Alcohol use: Yes     Comment: minimally 3 times a year    Drug use: No    Sexual activity: Yes     Partners: Male   Other Topics Concern    Exercise Yes   Social History Narrative    Daughter is an [employer] RN     Social Determinants of Health     Food Insecurity: No Food Insecurity (2024)    Food Insecurity     Food Insecurity: Never true   Transportation Needs: No Transportation Needs (2024)    Transportation Needs     Lack of Transportation: No    Received from [facility] Short Social Needs Screening - Social Connection   Housing Stability: Low Risk  (2024)    Housing Stability     Housing Instability: No     Family History   Problem Relation Age of Onset    Heart Disorder Father         CAD    Other Mother         staph infection, RA    Heart Disorder Paternal Grandmother     Heart Disorder Paternal Grandfather     Other Brother         TIA     Reviewed, see above    Medications:    acetaminophen    ondansetron    cefTRIAXone    heparin    sodium chloride     Allergies:  Allergies   Allergen Reactions    Pcn [Penicillins] UNKNOWN     As a child    Sulfa Antibiotics RASH       Physical Exam:  Vitals:    24 0455   BP: 129/66   Pulse: 77    Resp: 16   Temp: 98.5 °F (36.9 °C)     Vitals signs and nursing note reviewed.   Constitutional:       Appearance: Normal appearance.   HENT:      Head: Normocephalic and atraumatic.      Mouth: Mucous membranes are moist.   Neck:      Musculoskeletal: Neck supple.   Cardiovascular:      Rate and Rhythm: Normal rate.   Pulmonary:      Effort: Pulmonary effort is normal. No respiratory distress.   Abdominal:      General: Abdomen is flat. There is no distension.      Palpations: Abdomen is soft. There is no mass.      Tenderness: There is no tenderness. There is no guarding or rebound.      Hernia: No hernia is present.   Musculoskeletal:      Right lower leg: No edema.      Left lower leg: No edema.   Skin:     General: Skin is warm and dry.   Neurological:      General: No focal deficit present.      Mental Status: Alert and oriented to person, place, and time.     Laboratory data:  I have independently reviewed all lab results; including old microbiological results.  Lab Results   Component Value Date    WBC 8.0 06/28/2024    HGB 13.0 06/28/2024    HCT 38.5 06/28/2024    .0 06/28/2024    CREATSERUM 0.89 06/28/2024    BUN 14 06/28/2024     06/28/2024    K 3.2 06/28/2024     06/28/2024    CO2 23.0 06/28/2024     06/28/2024    CA 8.6 06/28/2024    ALB 3.2 06/27/2024    ALKPHO 242 06/27/2024    BILT 1.3 06/27/2024    TP 7.3 06/27/2024    AST 25 06/27/2024    ALT 33 06/27/2024        Recent Labs   Lab 06/28/24  0445   RBC 4.44   HGB 13.0   HCT 38.5   MCV 86.7   MCH 29.3   MCHC 33.8   RDW 13.2   NEPRELIM 5.58   WBC 8.0   .0       Microbiology data:  No results found for this visit on 06/27/24.      Radiology:  I have reviewed all imaging data available independently.   CT abdomen pelvis on 6/27:  Right-sided pyelonephritis with nephrolithiasis, mild hydronephrosis.  Stent in good position.    Impression:  Chloe Wall is a 78 year old female with     Complicated right-sided  pyelonephritis in the setting of nephrolithiasis, recent cystoscopy with lithotripsy back on Monday, 6/24  Urine culture as an outpatient had grown Enterococcus faecalis and corynebacterium  Currently on IV ceftriaxone  Patient clinically improving despite being on IV ceftriaxone that has no coverage against Enterococcus faecalis  Reported allergy to penicillin  Known allergy.  The patient received penicillin as a child without any reaction.  He was told by her grandmother that she is allergic.    Recommendations:     Continue to follow-up on urine cultures and blood cultures  Continue ceftriaxone for now  If the patient clinical status worsens, then would escalate to IV Zosyn pending culture data  Continue to monitor daily labs for antibiotic toxicity  Further recommendations will depend on the above work-up and clinical progress     The plan of care was discussed with the primary hospital team, Waqar Escalera MD     Recommendations were also discussed with the patient; all questions were answered.     Thank you for this consultation. Please don't hesitate to call the ID team for questions or any acute changes in patient's clinical condition.    Please note that this report has been produced using speech recognition software and may contain errors related to that system including, but not limited to, errors in grammar, punctuation, and spelling, as well as words and phrases that possibly may have been recognized inappropriately.  If there are any questions or concerns, contact the dictating provider for clarification.    The 21st Century Cures Act makes medical notes like these available to patients in the interest of transparency. Please be advised this is a medical document. Medical documents are intended to carry relevant information, facts as evident, and the clinical opinion of the practitioner. The medical note is intended as peer to peer communication and may appear blunt or direct. It is written in medical  language and may contain abbreviations or verbiage that are unfamiliar.     MD BERENICE [facility] Infectious Disease. Tel: [phone]. Fax: [phone].     Chloe Wall : 1945 MRN: [id number] CSN: [id number]

## 2024-06-28 NOTE — PLAN OF CARE
Continues to have urinary spasms and urgency  IVF  Hold on home medication phenazopyridine per urology due to creat clearance  Other homes meds to be resumed per hosp.   ID consult placed by urology   Monitoring temperature trend

## 2024-06-28 NOTE — ED QUICK NOTES
Informed by family that PT feels dizzy and as though she is going to pass out. They are requesting a cot for the patient. The PT and family were told that we unfortunately do not have a place for the PT to lie down. ER charge RN is aware and vital signs will need to be redone.

## 2024-06-28 NOTE — ED INITIAL ASSESSMENT (HPI)
Patient here with c/o fevers since Monday.  Patient had a lithotripsy on Monday and has fevers since then.  Told by urologist to come to ED to R/O sepsis.

## 2024-06-28 NOTE — PLAN OF CARE
NURSING ADMISSION NOTE      Patient admitted to unit from ED around 0030.  Oriented to room.  Safety precautions initiated.  Bed in low position.  Bed alarm on.  Call light in reach.  Admission navigator completed with patient and daughter, Aleksandra, at bedside.    AxOx4  MAMADOU VSS  No telemetry monitoring orders  0.9 NS infusing into the RAC PIV  IV rocephin q24  PRN tylenol given for headache  Patient up stand by assist after set up  Cardiac diet  Denies nausea/vomiting  Urology to see  Needs met at this time.    Problem: METABOLIC/FLUID AND ELECTROLYTES - ADULT  Goal: Electrolytes maintained within normal limits  Description: INTERVENTIONS:  - Monitor labs and rhythm and assess patient for signs and symptoms of electrolyte imbalances  - Administer electrolyte replacement as ordered  - Monitor response to electrolyte replacements, including rhythm and repeat lab results as appropriate    Outcome: Progressing     Problem: PAIN - ADULT  Goal: Verbalizes/displays adequate comfort level or patient's stated pain goal  Description: INTERVENTIONS:  - Encourage pt to monitor pain and request assistance  - Assess pain using appropriate pain scale  - Administer analgesics based on type and severity of pain and evaluate response  - Implement non-pharmacological measures as appropriate and evaluate response  - Consider cultural and social influences on pain and pain management  - Manage/alleviate anxiety  - Utilize distraction and/or relaxation techniques  - Monitor for opioid side effects  - Notify MD/LIP if interventions unsuccessful or patient reports new pain  - Anticipate increased pain with activity and pre-medicate as appropriate  Outcome: Progressing     Problem: SAFETY ADULT - FALL  Goal: Free from fall injury  Description: INTERVENTIONS:  - Assess pt frequently for physical needs  - Identify cognitive and physical deficits and behaviors that affect risk of falls.  - [place] fall precautions as indicated by  assessment.  - Educate pt/family on patient safety including physical limitations  - Instruct pt to call for assistance with activity based on assessment  - Modify environment to reduce risk of injury  - Provide assistive devices as appropriate  - Consider OT/PT consult to assist with strengthening/mobility  - Encourage toileting schedule  Outcome: Progressing

## 2024-06-29 VITALS
HEART RATE: 60 BPM | TEMPERATURE: 98 F | WEIGHT: 187.81 LBS | SYSTOLIC BLOOD PRESSURE: 138 MMHG | OXYGEN SATURATION: 97 % | RESPIRATION RATE: 17 BRPM | DIASTOLIC BLOOD PRESSURE: 69 MMHG | HEIGHT: 64 IN | BODY MASS INDEX: 32.06 KG/M2

## 2024-06-29 LAB
ANION GAP SERPL CALC-SCNC: 5 MMOL/L (ref 0–18)
BASOPHILS # BLD AUTO: 0.03 X10(3) UL (ref 0–0.2)
BASOPHILS NFR BLD AUTO: 0.3 %
BUN BLD-MCNC: 16 MG/DL (ref 9–23)
C DIFF TOX B STL QL: NEGATIVE
CALCIUM BLD-MCNC: 8.9 MG/DL (ref 8.5–10.1)
CHLORIDE SERPL-SCNC: 115 MMOL/L (ref 98–112)
CO2 SERPL-SCNC: 24 MMOL/L (ref 21–32)
CREAT BLD-MCNC: 0.9 MG/DL
EGFRCR SERPLBLD CKD-EPI 2021: 65 ML/MIN/1.73M2 (ref 60–?)
EOSINOPHIL # BLD AUTO: 0.01 X10(3) UL (ref 0–0.7)
EOSINOPHIL NFR BLD AUTO: 0.1 %
ERYTHROCYTE [DISTWIDTH] IN BLOOD BY AUTOMATED COUNT: 13.1 %
GLUCOSE BLD-MCNC: 87 MG/DL (ref 70–99)
HCT VFR BLD AUTO: 37.1 %
HGB BLD-MCNC: 12.7 G/DL
IMM GRANULOCYTES # BLD AUTO: 0.07 X10(3) UL (ref 0–1)
IMM GRANULOCYTES NFR BLD: 0.8 %
LYMPHOCYTES # BLD AUTO: 1.96 X10(3) UL (ref 1–4)
LYMPHOCYTES NFR BLD AUTO: 22.5 %
MAGNESIUM SERPL-MCNC: 2.2 MG/DL (ref 1.6–2.6)
MCH RBC QN AUTO: 29.3 PG (ref 26–34)
MCHC RBC AUTO-ENTMCNC: 34.2 G/DL (ref 31–37)
MCV RBC AUTO: 85.5 FL
MONOCYTES # BLD AUTO: 1.55 X10(3) UL (ref 0.1–1)
MONOCYTES NFR BLD AUTO: 17.8 %
NEUTROPHILS # BLD AUTO: 5.11 X10 (3) UL (ref 1.5–7.7)
NEUTROPHILS # BLD AUTO: 5.11 X10(3) UL (ref 1.5–7.7)
NEUTROPHILS NFR BLD AUTO: 58.5 %
OSMOLALITY SERPL CALC.SUM OF ELEC: 299 MOSM/KG (ref 275–295)
PLATELET # BLD AUTO: 238 10(3)UL (ref 150–450)
POTASSIUM SERPL-SCNC: 3.6 MMOL/L (ref 3.5–5.1)
POTASSIUM SERPL-SCNC: 3.6 MMOL/L (ref 3.5–5.1)
RBC # BLD AUTO: 4.34 X10(6)UL
SODIUM SERPL-SCNC: 144 MMOL/L (ref 136–145)
WBC # BLD AUTO: 8.7 X10(3) UL (ref 4–11)

## 2024-06-29 PROCEDURE — 85025 COMPLETE CBC W/AUTO DIFF WBC: CPT

## 2024-06-29 PROCEDURE — 84132 ASSAY OF SERUM POTASSIUM: CPT | Performed by: HOSPITALIST

## 2024-06-29 PROCEDURE — 80048 BASIC METABOLIC PNL TOTAL CA: CPT

## 2024-06-29 PROCEDURE — 87493 C DIFF AMPLIFIED PROBE: CPT | Performed by: HOSPITALIST

## 2024-06-29 PROCEDURE — 83735 ASSAY OF MAGNESIUM: CPT

## 2024-06-29 RX ORDER — CEFPODOXIME PROXETIL 200 MG/1
400 TABLET, FILM COATED ORAL 2 TIMES DAILY
Qty: 40 TABLET | Refills: 0 | Status: SHIPPED | OUTPATIENT
Start: 2024-06-29 | End: 2024-07-09

## 2024-06-29 NOTE — PLAN OF CARE
Pt resting without complaints of pain this morning. Pt states headache better after the Excedrin Migraine given overnight. K 3.6 this morning - no replacement needed.

## 2024-06-29 NOTE — PROGRESS NOTES
[facility]   part of [facility]     Hospitalist Progress Note     Chloe Wall Patient Status:  Inpatient    1945 MRN [id number]   [facility] 3NW-A Attending Waqar Escalera MD   Hosp Day # 1 PCP [facility] MD     Chief Complaint: dysuria    Subjective:     Patient is doing well, no complaints at this time, no pain anywhere no fevers overnight    Objective:    Review of Systems:   A comprehensive review of systems was completed; pertinent positive and negatives stated in subjective.    Vital signs:  Temp:  [97.6 °F (36.4 °C)-98.3 °F (36.8 °C)] 97.6 °F (36.4 °C)  Pulse:  [60-73] 60  Resp:  [16-18] 16  BP: (120-127)/(59-65) 124/59  SpO2:  [95 %-98 %] 97 %    Physical Exam:    General: No acute distress  Respiratory: No wheezes, no rhonchi  Cardiovascular: S1, S2, regular rate and rhythm  Abdomen: Soft, Non-tender, non-distended, positive bowel sounds  Neuro: No new focal deficits.   Extremities: No edema      Diagnostic Data:    Labs:  Recent Labs   Lab 24  0023 245 24  0640   WBC 13.7* 10.5 8.0 8.7   HGB 14.0 14.8 13.0 12.7   MCV 88.5 83.7 86.7 85.5   .0 244.0 219.0 238.0       Recent Labs   Lab 24  0023 24  0646   * 112* 101* 87   BUN 14 15 14 16   CREATSERUM 1.14* 0.94 0.89 0.90   CA 9.5 9.2 8.6 8.9   ALB 3.2* 3.2*  --   --     138 138 144   K 3.6 3.5 3.2* 3.6  3.6    106 108 115*   CO2 26.0 24.0 23.0 24.0   ALKPHO 110 242*  --   --    AST 26 25  --   --    ALT 28 33  --   --    BILT 1.0 1.3  --   --    TP 6.7 7.3  --   --        Estimated Creatinine Clearance: 43.8 mL/min (based on SCr of 0.9 mg/dL).    No results for input(s): \"TROP\", \"TROPHS\", \"CK\" in the last 168 hours.    No results for input(s): \"PTP\", \"INR\" in the last 168 hours.               Microbiology    Hospital Encounter on 24   1. Blood Culture     Status: None (Preliminary result)    Collection Time:  06/27/24  9:14 PM    Specimen: Blood,peripheral   Result Value Ref Range    Blood Culture Result No Growth 1 Day N/A   2. Urine Culture, Routine     Status: None    Collection Time: 06/27/24  8:14 PM    Specimen: Urine, clean catch   Result Value Ref Range    Urine Culture No Growth at 18-24 hrs. N/A         Imaging: Reviewed in Epic.    Medications:    heparin  5,000 Units Subcutaneous Q8H SRAVANTHI    cefTRIAXone  2 g Intravenous Q24H       Assessment & Plan:        Sepsis 2/2 UTI and pyelonephritis  -s/p lithotripsy w stent placement 6/24, started to have persistent fevers (101) the day after procedure, was on cipro after ED visit on 6/25 but fever persisted so presented to [facility] ED  -afebrile, however with low-grade fevers of 100, no leukocytosis, blood cxs in process  -UA + for UTI, urine cxs in process, IV rocephin started, ID consulted >> if clinical status deteriorates, will broaden spectrum   -CT ab with right ureteral stent in good position, mild hydro, intrarenal calcifications, pyelonephritis w ascending ureteritis and cystitis, mild ileus   -urology consulted, stone should pass on it's own, await final urine cxs  -hold phenazopyridine per urology   -Continue to follow  -Symptoms and significant improved, urine culture without any growth, defer further antibiotics to infectious disease  Okay to discharge per urology with outpatient follow-up on Tuesday for stent removal-     # Aortic atherosclerosis  # Major depression-patient stopped taking Wellbutrin about to be 2 years ago  # Hypothyroidism-patient recently stopped taking her Synthroid, will check a TSH at this time  # History of CKD stage III creatinine appears to be at-normal at this time      Ottoniel Jordan MD    Supplementary Documentation:     Quality:  DVT Mechanical Prophylaxis:   SCDs, Early ambuation  DVT Pharmacologic Prophylaxis   Medication    heparin (Porcine) 5000 UNIT/ML injection 5,000 Units                Code Status: Not on file  Gregory:  No urinary catheter in place  Gregory Duration (in days):   Central line:    DOMINIC:       The 21st Century Cures Act makes medical notes like these available to patients in the interest of transparency. Please be advised this is a medical document. Medical documents are intended to carry relevant information, facts as evident, and the clinical opinion of the practitioner. The medical note is intended as peer to peer communication and may appear blunt or direct. It is written in medical language and may contain abbreviations or verbiage that are unfamiliar.             **Certification      PHYSICIAN Certification of Need for Inpatient Hospitalization - Initial Certification    Patient will require inpatient services that will reasonably be expected to span two midnight's based on the clinical documentation in H+P.   Based on patients current state of illness, I anticipate that, after discharge, patient will require TBD.

## 2024-06-29 NOTE — PLAN OF CARE
Pt given dc instructions. RX ready at pt's pharmacy. Pt discharged via wheelchair with all  belongings.

## 2024-06-29 NOTE — PROGRESS NOTES
A/Ox4, RA, VSS, IVF per order.  Tolerating a cardiac diet well.  Voids freely.  Headache managed with prn excedrin.  No complaints of nausea.  Pt updated on plan of care, call light and belongings within reach, questions and concerns addressed.

## 2024-06-29 NOTE — PROGRESS NOTES
Resumed care at 1730. Patient arrived to unit in stable condition. VSS. Patient c/o severe headache. Dr. Jordan paged. Orders received for Excedrin. Patient states she has relief from the Excedrin.

## 2024-06-29 NOTE — PROGRESS NOTES
[facility] Urology Progress Note    Chloe Wall Patient Status:  Inpatient    1945 MRN [id number]   Location [facility] 3NW-A Attending Waqar Escalera MD   Hosp Day # 1 PCP [facility] MD     Subjective:  Chloe Wall is a(n) 78 year old female.    She is feeling well. No fevers. Headache resolved. Minimal stent irritation. No hematuria.    Objective:  Temp (24hrs), Av.4 °F (36.9 °C), Min:97.6 °F (36.4 °C), Max:99.2 °F (37.3 °C)    /59 (BP Location: Left arm)   Pulse 60   Temp 97.6 °F (36.4 °C) (Oral)   Resp 16   Ht 5' 4\" (1.626 m)   Wt 187 lb 12.8 oz (85.2 kg)   SpO2 97%   BMI 32.24 kg/m²     Intake/Output Summary (Last 24 hours) at 2024 1129  Last data filed at 2024 0638  Gross per 24 hour   Intake 2775 ml   Output --   Net 2775 ml     General: Calm, NAD  HEENT: NCAT, no scleral icterus  CV: RR  Pulmonary: breathing unlabored, symmetric bilaterally, no audible wheezes          Laboratory Data:  Lab Results   Component Value Date    WBC 8.7 2024    HGB 12.7 2024    HCT 37.1 2024    .0 2024    CREATSERUM 0.90 2024    BUN 16 2024     2024    K 3.6 2024    K 3.6 2024     2024    CO2 24.0 2024    GLU 87 2024    CA 8.9 2024    MG 2.2 2024         Hospital Problem List:  Patient Active Problem List   Diagnosis    GERD (gastroesophageal reflux disease)    Hypothyroid    Overweight(278.02)    Depression    Coronary artery disease    Osteopenia    Hyperlipidemia, unspecified hyperlipidemia type    Hypothyroidism, unspecified type    Other depression    Gastroesophageal reflux disease without esophagitis    Coronary artery disease involving native coronary artery of native heart without angina pectoris    Aortic atherosclerosis (HCC)    CKD (chronic kidney disease) stage 3, GFR 30-59 ml/min (HCC)    Benign neoplasm of colon    Diverticulitis of  colon    Osteoporosis    Urinary tract infection, site not specified    Sepsis due to urinary tract infection (HCC)       IMPRESSION    RIGHT 15 MM RENAL COLLECTING SYSTEM STONE, LEFT 3 MM RENAL PARENCHYMAL STONE  S/P cystoscopy, right RGPG, placement of right ureteral stent, ureteroscopy, stone laser lithotripsy on 6/24/24 with Dr. Esteban     ADMITTED WITH FEVER/CHILLS, UTI  CT images reviewed  Afeb >24 hrs, UCx/BCx neg    PLAN  1. Abx per ID  2. Ok to DC from  standpoint. Follow up Tuesday as scheduled for office cystoscopy and stent removal with Dr. Sauceda. We will repeat a KUB that day in the office to ensure the fragments have passed prior to removing the stent.    The above impression and plan were discussed in detail with the patient who verbalized understanding and all questions were answered.    Jesus Coon D.O.  [facility]  Department of Urology      6/29/2024  11:29 AM

## 2024-06-29 NOTE — PROGRESS NOTES
[facility]   part of [facility] Infectious Disease  Progress Note    Chloe Wall Patient Status:  Inpatient    1945 MRN [id number]   Location [facility] 3NW-A Attending Waqar Escalera MD   Hosp Day # 1 PCP [facility] MD     Subjective:    Patient seen and examined resting in bed, tolerating the IV abx. Diarrhea on and off. No new complaints or concerns.     Review of Systems:  Review of systems reviewed and negative except as mentioned    Objective:  Blood pressure 124/59, pulse 60, temperature 97.6 °F (36.4 °C), temperature source Oral, resp. rate 16, height 5' 4\" (1.626 m), weight 187 lb 12.8 oz (85.2 kg), SpO2 97%.        Physical Exam:  General: Awake, alert, non-tox, NAD.  HEENT:  Oropharynx clear, trachea ML.  Heart: RRR S1S2 no murmurs.  Lungs: Essentially CTA b/l, no rhonchi, rales, wheezes.  Abdomen: Soft, NT/ND.  BS present.  No organomegaly.  Extremity: No edema.  Neurological: No focal deficits.  Derm:  Warm, dry, free from rashes.    Lab Data Review:  Lab Results   Component Value Date    WBC 8.7 2024    HGB 12.7 2024    HCT 37.1 2024    .0 2024    CREATSERUM 0.90 2024    BUN 16 2024     2024    K 3.6 2024    K 3.6 2024     2024    CO2 24.0 2024    GLU 87 2024    CA 8.9 2024    MG 2.2 2024        Cultures:  Hospital Encounter on 24   1. Blood Culture     Status: None (Preliminary result)    Collection Time: 24  9:14 PM    Specimen: Blood,peripheral   Result Value Ref Range    Blood Culture Result No Growth 1 Day N/A   2. Urine Culture, Routine     Status: None    Collection Time: 24  8:14 PM    Specimen: Urine, clean catch   Result Value Ref Range    Urine Culture No Growth at 18-24 hrs. N/A        Radiology:  CT ABDOMEN+PELVIS(CONTRAST ONLY)(CPT=74177)    Result Date: 2024  CONCLUSION:  1. Right ureteral stent identified in  good position.  There is mild right-sided hydronephrosis.  There are numerous right intrarenal calcifications and a few calcifications adjacent to the stent in the pelvic ureter.  There is a focus of pyelonephritis with ascending ureteritis and cystitis noted. 2. Mild ileus.  Small hiatal hernia. Numeral 3. 5.2 cm fibroid within a retroverted uterus.    LOCATION:  [facility]   Dictated by (CST): Stone Garcia MD on 6/27/2024 at 10:21 PM     Finalized by (CST): Stone Garcia MD on 6/27/2024 at 10:41 PM       XR CHEST AP PORTABLE  (CPT=71045)    Result Date: 6/27/2024  CONCLUSION:  No evidence of active cardiopulmonary disease.   LOCATION:  [id number]      Dictated by (CST): Tyrone Irene MD on 6/27/2024 at 8:59 PM     Finalized by (CST): Tyrone Irene MD on 6/27/2024 at 9:00 PM       XR OR - N/C    Result Date: 6/24/2024  CONCLUSION:  Intraoperative fluoroscopic ground-glass during right pyelogram.   LOCATION:  [facility]    Dictated by (CST): Itz Lewis MD on 6/24/2024 at 4:47 PM     Finalized by (CST): Itz Lewis MD on 6/24/2024 at 4:48 PM          Assessment and Plan:    Complicated right-sided pyelonephritis in the setting of nephrolithiasis, recent cystoscopy with lithotripsy back on Monday, 6/24  - was on macrobid prior to procedure and then cipro PTA  - Urine culture as an outpatient had grown Enterococcus faecalis and corynebacterium  - Patient clinically improving despite being on IV ceftriaxone that has no coverage against Enterococcus faecalis  - UC here with no growth  - Blood cultures NGTD  - IV ceftriaxone ongoing     Leukocytosis  - 2/2 above  - resolved    Diarrhea  - C diff pending  - on and off per patient report       Reported allergy to penicillin  - Known allergy.  The patient received penicillin as a child without any reaction.  He was told by her grandmother that she is allergic.    Recommendations  - Continue IV ceftriaxone as Rx with plans to dc on PO cefpodoxime 400mg BID for 10 more  days.  - C.diff pending, if positive will add PO Vancomycin 125mg QID x10 days   - Follow pending cultures  - Repeat labs tomorrow  - Stable to dc from ID standpoint when okay with others, follow up with ID in 10-14 days okay for VV if needed.    Plan of care dicussed with Dr. Maradiaga he is in agreement with the plan of care.    Discussed with RN       If you have any questions or concerns please call [facility] Infectious Disease at [phone].     LALITO Mccall    6/29/2024  9:35 AM

## 2024-07-01 NOTE — PAYOR COMM NOTE
--------------  DISCHARGE REVIEW    Pay[employer]: [employer]  Subscriber #:  [id number]  Authorization Number: [id number]    Admit date: 6/28/24  Admit time:  12:43 AM  Discharge Date: 6/29/2024  4:15 PM     Admitting Physician: Waqar Escalera MD  Attending Physician:  No att. providers found  Primary Care Physician: Edward Morris MD       Discharge Summary Notes    No notes of this type exist for this encounter.         REVIEWER COMMENTS

## 2024-09-19 ENCOUNTER — LABORATORY ENCOUNTER (OUTPATIENT)
Dept: LAB | Facility: HOSPITAL | Age: 79
End: 2024-09-19
Attending: ORTHOPAEDIC SURGERY
Payer: MEDICARE

## 2024-09-19 DIAGNOSIS — M16.11 OSTEOARTHRITIS OF RIGHT HIP: ICD-10-CM

## 2024-09-19 DIAGNOSIS — Z01.818 PRE-OP TESTING: ICD-10-CM

## 2024-09-19 LAB
ALBUMIN SERPL-MCNC: 4.3 G/DL (ref 3.2–4.8)
ALBUMIN/GLOB SERPL: 1.2 {RATIO} (ref 1–2)
ALP LIVER SERPL-CCNC: 121 U/L
ALT SERPL-CCNC: 14 U/L
ANION GAP SERPL CALC-SCNC: 9 MMOL/L (ref 0–18)
ANTIBODY SCREEN: NEGATIVE
AST SERPL-CCNC: 17 U/L (ref ?–34)
BASOPHILS # BLD AUTO: 0.05 X10(3) UL (ref 0–0.2)
BASOPHILS NFR BLD AUTO: 0.5 %
BILIRUB SERPL-MCNC: 0.8 MG/DL (ref 0.2–1.1)
BUN BLD-MCNC: 15 MG/DL (ref 9–23)
CALCIUM BLD-MCNC: 10.4 MG/DL (ref 8.7–10.4)
CHLORIDE SERPL-SCNC: 108 MMOL/L (ref 98–112)
CO2 SERPL-SCNC: 23 MMOL/L (ref 21–32)
CREAT BLD-MCNC: 1.03 MG/DL
EGFRCR SERPLBLD CKD-EPI 2021: 55 ML/MIN/1.73M2 (ref 60–?)
EOSINOPHIL # BLD AUTO: 0.11 X10(3) UL (ref 0–0.7)
EOSINOPHIL NFR BLD AUTO: 1.2 %
ERYTHROCYTE [DISTWIDTH] IN BLOOD BY AUTOMATED COUNT: 13.5 %
FASTING STATUS PATIENT QL REPORTED: YES
GLOBULIN PLAS-MCNC: 3.5 G/DL (ref 2–3.5)
GLUCOSE BLD-MCNC: 90 MG/DL (ref 70–99)
HCT VFR BLD AUTO: 46.7 %
HGB BLD-MCNC: 16 G/DL
IMM GRANULOCYTES # BLD AUTO: 0.05 X10(3) UL (ref 0–1)
IMM GRANULOCYTES NFR BLD: 0.5 %
LYMPHOCYTES # BLD AUTO: 2.1 X10(3) UL (ref 1–4)
LYMPHOCYTES NFR BLD AUTO: 22.8 %
MCH RBC QN AUTO: 28.9 PG (ref 26–34)
MCHC RBC AUTO-ENTMCNC: 34.3 G/DL (ref 31–37)
MCV RBC AUTO: 84.4 FL
MONOCYTES # BLD AUTO: 1.01 X10(3) UL (ref 0.1–1)
MONOCYTES NFR BLD AUTO: 10.9 %
NEUTROPHILS # BLD AUTO: 5.91 X10 (3) UL (ref 1.5–7.7)
NEUTROPHILS # BLD AUTO: 5.91 X10(3) UL (ref 1.5–7.7)
NEUTROPHILS NFR BLD AUTO: 64.1 %
OSMOLALITY SERPL CALC.SUM OF ELEC: 290 MOSM/KG (ref 275–295)
PLATELET # BLD AUTO: 271 10(3)UL (ref 150–450)
POTASSIUM SERPL-SCNC: 4.2 MMOL/L (ref 3.5–5.1)
PROT SERPL-MCNC: 7.8 G/DL (ref 5.7–8.2)
RBC # BLD AUTO: 5.53 X10(6)UL
RH BLOOD TYPE: POSITIVE
SODIUM SERPL-SCNC: 140 MMOL/L (ref 136–145)
WBC # BLD AUTO: 9.2 X10(3) UL (ref 4–11)

## 2024-09-19 PROCEDURE — 87081 CULTURE SCREEN ONLY: CPT

## 2024-09-19 PROCEDURE — 36415 COLL VENOUS BLD VENIPUNCTURE: CPT

## 2024-09-19 PROCEDURE — 80053 COMPREHEN METABOLIC PANEL: CPT

## 2024-09-19 PROCEDURE — 86850 RBC ANTIBODY SCREEN: CPT

## 2024-09-19 PROCEDURE — 86901 BLOOD TYPING SEROLOGIC RH(D): CPT

## 2024-09-19 PROCEDURE — 85025 COMPLETE CBC W/AUTO DIFF WBC: CPT

## 2024-09-19 PROCEDURE — 86900 BLOOD TYPING SEROLOGIC ABO: CPT

## 2024-09-28 NOTE — H&P
[facility]  Outside Information  Office Visit  9/11/2024  Family Medicine - [address] \"Pat\" - 79 y.o. Female; born [birth year]Encounter Summary, generated on Sep. 20, 2024September 20, 2024   Reason for Visit    Reason for Visit -  Reason Comments   Annual       Encounter Details    Encounter Details  Date Type Department Care Team (Latest Contact Info) Description   09/11/2024 10:00 AM CDT Office Visit Family Medicine - [address], Keri   [address] [place], [place] [address] [phone]  Edward Morris MD   [address] [place] [address] [phone] (Work)   [phone] (Fax)  Pre-op exam (Primary Dx);  Routine health maintenance;  Stage 3a chronic kidney disease (HCC);  Hypothyroidism, unspecified type;  Recurrent major depressive disorder, in full remission (HCC);  Aortic atherosclerosis (HCC);  Coronary artery disease involving native coronary artery of native heart without angina pectoris;  Hyperlipidemia, unspecified hyperlipidemia type;  Encounter for screening mammogram for malignant neoplasm of breast;  Osteopenia of multiple sites     Social History  - documented as of this encounter  Social History  Tobacco Use Types Packs/Day Years Used Date   Smoking Tobacco: Former Cigarettes 0.5 15 1958 - 01/01/1973   Smokeless Tobacco: Never             Social History  Tobacco Cessation: Counseling Given: Not Answered     Social History  Alcohol Use Standard Drinks/Week Comments   Yes 0 (1 standard drink = 0.6 oz pure alcohol) minimally     Social History  PHQ-2 Answer Date Recorded   PHQ-2 SCORE 0 09/11/2024     Social History  Sex and Gender Information Value Date Recorded   Sex Assigned at Birth Not on file     Gender Identity Not on file     Sexual Orientation Not on file       Last Filed Vital Signs  - documented in this encounter  Last Filed Vital Signs  Vital Sign Reading Time Taken Comments   Blood Pressure 122/76 09/11/2024 10:01  AM CDT     Pulse 88 09/11/2024 10:01 AM CDT     Temperature - -     Respiratory Rate - -     Oxygen Saturation 98% 09/11/2024 10:01 AM CDT     Inhaled Oxygen Concentration - -     Weight 85.2 kg (187 lb 12.8 oz) 09/11/2024 10:01 AM CDT     Height 161.3 cm (5' 3.5\") 09/11/2024 10:01 AM CDT     Body Mass Index 32.75 09/11/2024 10:01 AM CDT       Functional Status  - documented as of this encounter  Functional Status  Functional Status Response Date of Assessment   Hearing Problems? Yes 09/11/2024   Vision Problems? No 09/11/2024   Difficulty walking? Yes 09/11/2024   Difficulty dressing or bathing? No 09/11/2024   Problems with daily activities? No 09/11/2024     Functional Status  Cognitive Status Response Date of Assessment   Memory Problems? No 09/11/2024     Progress Notes  - documented in this encounter  Edward Morris MD - 09/11/2024 10:00 AM CDT  Formatting of this note is different from the original.  Images from the original note were not included.  Chloe Wall is a 79 year old female who presents for a MA Supervisit.    Pre op exam  Scheduled for right DALIA with Dr. Shaji Carver.    tdap 2020  covid x2  pcv 2016  ppsv 2012  Pcv 20 declined  Flu declined  Shingles declined  mammo 2023  Colonoscopy 2011  DXA 2019 penia    1. Aortic atherosclerosis  2. Coronary artery disease  3. Dyslipidemia    Patient currently is taking fenofibrate and atorvastatin.  In 2017 she had a cardiac catheterization that showed a 40 to 50% blockage of the LAD. They normally are followed by Dr. Paniagua.    5. Major depression-in remission  The patient has stopped taking Wellbutrin and reports that emotionally she is doing okay with some episodes of depression but not to the degree that she wants to resume medication.    6. Hypothyroidism.  Currently not taking medication  She want to be retested    7. Chronic kidney disease 3  She currently has been doing well and is not on blood pressure medicines at this time.  No edema  She is  followed by Dr. Frank    8. Right hip pain.  Planning surgery.    Patient Care Team: Patient Care Team:  Edward Morris MD as PCP - General    No current outpatient medications on file.    Latest Ref Rng & Units 6/14/2024  12:16 PM 4/14/2023  12:00 AM 6/7/2022  9:52 AM 8/11/2021  10:55 AM 5/26/2020  11:52 AM 5/19/2020  8:13 AM 5/30/2019  12:13 PM  Glucose and HbA1c  Glucose 74 - 109 mg/dL 87 84 94 98 87 94 91    This result is from an external source.    Latest Ref Rng & Units 6/14/2024  12:16 PM 4/14/2023  12:00 AM 6/7/2022  9:52 AM 8/11/2021  10:55 AM 5/26/2020  11:52 AM 4/9/2018  2:38 PM 5/22/2017  11:35 AM  Cholesterol  Triglycerides <=150 mg/dL 216 193 100.00 125.00 155.00 148 218  HDL >=40 mg/dL 53 55 61.0 64 54 48 46  LDL <=130 mg/dL 183 196 98 107 100 90 75    This result is from an external source.    Latest Ref Rng & Units 6/14/2024  12:16 PM 4/14/2023  12:00 AM 11/19/2021  10:33 AM 8/11/2021  10:55 AM 5/26/2020  11:52 AM 5/19/2020  8:13 AM 5/30/2019  12:13 PM  BUN and Cr  BUN 6.0 - 20.0 mg/dL 17.0 14.0 18.0 17.0 15.0 18.0 22.0  Creatinine 0.5 - 0.9 mg/dL 0.95 0.79 1.12 1.08 1.20 1.27 1.10    This result is from an external source.    Latest Ref Rng & Units 6/14/2024  12:16 PM 4/14/2023  12:00 AM 6/7/2022  9:52 AM 8/11/2021  10:55 AM 5/26/2020  11:52 AM 4/29/2019  1:19 PM 4/9/2018  2:38 PM  AST and ALT  AST (SGOT) 0 - 32 U/L 21 25 24 21 17  ALT (SGPT) 0 - 33 U/L 20 21 22 33 30 23  ALT (SGPT) IU/L 12    This result is from an external source.    Latest Ref Rng & Units 9/11/2024  11:21 AM 6/14/2024  12:16 PM 4/14/2023  12:00 AM 6/7/2022  9:52 AM 8/11/2021  10:55 AM 5/26/2020  11:52 AM 4/29/2019  1:19 PM  TSH and Free T4  TSH 0.270 - 4.200 mIU/L 2.580 3.950 3.460 0.841 2.290 1.179 1.256  Free T4 0.93 - 1.70 ng/dL 1.04 1.08    This result is from an external source.      General Health  In the past six months, have you lost more than 10 pounds without trying?: 2 - No  Has your appetite been poor?: No  Type of  Diet: Balanced  How would you describe your daily physical activity?: Light  How would you describe your current health state?: Fair  If you are a male age 45-79 or a female age 55-79, do you take aspirin?: No  Have you had any immunizations at another office such as Influenza, Hepatitis B, Tetanus, or Pneumococcal?: No    Functional Ability  Bathing or Showering: Able without help  Toileting: Able without help  Dressing: Able without help  Eating: Able without help  Driving: Able without help  Preparing your meals: Able without help  Managing money/bills: Able without help  Taking medications as prescribed: Able without help  Are you able to afford your medications?: Yes  Hearing Problems?: Yes    Functional Status  Hearing Problems?: Yes  Vision Problems? : No  Difficulty walking?: Yes  Difficulty dressing or bathing?: No  Problems with daily activities? : No  Memory Problems?: No    Fall/Risk Assessment  Have you fallen in the last 12 months?: 0-No  Fall/Risk Scorin      Depression Screening (PHQ-2/PHQ-9): Over the LAST 2 WEEKS  Little interest or pleasure in doing things (over the last two weeks)?: Not at all  Feeling down, depressed, or hopeless (over the last two weeks)?: Not at all  PHQ-2 SCORE: 0      Advance Directives      Cognitive Assessment  What day of the week is this?: Correct  What month is it?: Correct  What year is it?: Correct      PREVENTATIVE SERVICES  INDICATIONS AND SCHEDULE Internal Lab or Procedure  Diabetes Screening  HbgA1C Annually HEMOGLOBIN A1C (%)  Date Value  05/10/2016 5.7 (H)    Fasting Blood Sugar (FSB)Annually Glucose  Date Value  2024 87 mg/dL  2023 84  05/10/2016 88 mg/dL    GLUCOSE (mg/dL)  Date Value  2011 92    Cardiovascular Disease Screening  LDL Annually LDL Cholesterol Calc (mg/dL)  Date Value  05/10/2016 98    LDL Cholesterol (mg/dL)  Date Value  2023 196    Calculated LDL (mg/dL)  Date Value  2024 183 (H)    EKG - w/ Initial  Preventative Physical Exam only, or if medically necessary  Colorectal Cancer Screening  Colonoscopy Screen every 10 years No recommendations at this time  Flex Sigmoidoscopy Screen every 5 years No results found for this or any previous visit.  Fecal Occult Blood Annually No results found for: \"FOB\", \"OCCULTSTOOL\"  Glaucoma Screening  Ophthalmology Visit Annually due  Immunizations  Zoster (Not covered by Medicare Part B) No orders found for this or any previous visit.    SPECIFIC DISEASE MONITORING Internal Lab or Procedure  No disease specific diagnoses      ALLERGIES:    Penicillins UNKNOWN  Comment:As a child  As a child  As a child  Seasonal Coughing  Sulfa Antibiotics RASH  MEDICAL INFORMATION:  Past Medical History:  Diagnosis Date  CORONARY ARTERY DISEASE   angiogram-minimal disease [place]  Disorder of thyroid  Esophageal reflux  HYPERLIPIDEMIA  OSTEOPENIA  OTHER DISEASES  diverticulosis Daly  OTHER DISEASES  fibroid    Past Surgical History:  Procedure Laterality Date   1966,,1970  CHOLECYSTECTOMY 1990  COLONOSCOPY 2006  polyps Daly  COLONOSCOPY 2011  diverticlitis, [place]  OTHER SURGICAL HISTORY 2011  laparoscopy hemicolectomy [place]  OTHER SURGICAL HISTORY 2024  cystoscopy with stent removal - Dr. Sauceda    Family History  Problem Relation Age of Onset  Heart Disorder Father  CAD  Other Mother  staph infection, RA  Heart Disorder Paternal Grandmother  Heart Disorder Paternal Grandfather  Other Brother  TIA    Immunization History    Immunization History  Administered Date(s) Administered  Covid-19 Vaccine Pfizer 30 mcg/0.3 ml  2021  Pneumococcal (Prevnar 13)  05/10/2016  Pneumovax 23 2012  TDAP 2008 10/30/2020    SOCIAL HISTORY:  Social History  Tobacco Use  Smoking status: Former  Packs/day: 0.00  Years: 0.5 packs/day for 15.0 years (7.5 ttl pk-yrs)  Types: Cigarettes  Start date:   Quit date:  1973  Years since quittin.7  Smokeless tobacco: Never  Alcohol use: Yes  Comment: minimally  Drug use: No    Occ: retired : yes    REVIEW OF SYSTEMS:  GENERAL: feels well otherwise  SKIN: denies any unusual skin lesions  EYES: denies blurred vision or double vision  HEENT: denies nasal congestion, sinus pain or ST  LUNGS: denies shortness of breath with exertion  CARDIOVASCULAR: denies chest pain on exertion  GI: denies abdominal pain, denies heartburn  : denies dysuria, vaginal discharge or itching, periods regular  MUSCULOSKELETAL: denies back pain  NEURO: denies headaches  PSYCHE: denies depression or anxiety  HEMATOLOGIC: denies hx of anemia  ENDOCRINE: denies thyroid history  ALL/ASTHMA: denies hx of allergy or asthma    EXAM:  /76  Pulse 88  Ht 63.5\"  Wt 187 lb 12.8 oz (85.2 kg)  SpO2 98%  BMI 32.75 kg/m²  > BP Readings from Last 3 Encounters:  24 : 122/76  24 : 124/82  23 : 120/82    GENERAL: well developed, well nourished, in no apparent distress, obese  SKIN: no rashes, no suspicious lesions  HEENT: atraumatic, normocephalic, ears and throat are clear  Hearing Assessed via:  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  EYES: PERRLA, EOMI, conjunctiva are clear  NECK: supple, no adenopathy, no bruits  CHEST: no chest tenderness  BREAST:  LUNGS: clear to auscultation  CARDIO: RRR without murmur  GI: good BS's, no masses, HSM or tenderness  : deferred  RECTAL: deferred  MUSCULOSKELETAL: back is not tender, FROM of the back  EXTREMITIES: no cyanosis, clubbing or edema  NEURO: Oriented times three, cranial nerves are intact, motor and sensory are grossly intact    LAB    ASSESSMENT AND OTHER RELEVANT CHRONIC CONDITIONS:  Chloe Wall is a 79 year old female who presents for a Medicare Assessment.    PLAN SUMMARY:    1. Routine health maintenance  tdap 2020  covid x2  pcv 2016  ppsv 2012  Pcv 20 declined  Flu declined  Shingles declined  mammo   Colonoscopy  2011  DXA 2019 penia    2. Stage 3a chronic kidney disease (HCC)  Blood pressure is stable  Will continue to monitor and follow  Due for CMP    3. Pre-op exam  Cleared for surgery  - ELECTROCARDIOGRAM, COMPLETE    4. Hypothyroidism, unspecified type  Will check a TSH and make further recommendations pending lab testing  - THYROID- STIMULATION HORMONE (TSH)  - FREE T4 (FREE THYROXINE); Future  - THYROID PEROXIDASE (TPO) ANTIBODY; Future  - THYROID-STIMULATING IMMUNOGLOBULIN (TSI); Future    5. Recurrent major depressive disorder, in full remission ([facility])  Doing well.  Is not on medication    6. Aortic atherosclerosis ([facility])  7. Coronary artery disease involving native coronary artery of native heart without angina pectoris  8. Hyperlipidemia, unspecified hyperlipidemia type  Would continue a statin  Cardiac catheterization revealed subclinical atherosclerosis  Followed by cardiology .    9. Encounter for screening mammogram for malignant neoplasm of breast  - ROSSANA SCREEN MAMMOGRAM, DIGITAL (CPT=77067); Future    10. Osteopenia of multiple sites  Will continue to monitor  Last bone density was in 2019 showed the following  Narrative  DATE OF SERVICE: 07.19.2019    CENTRAL DEXA SCAN  CLINICAL INDICATION: 73 years-old postmenopausal female for osteoporosis evaluation.  COMPARISON STUDY: 9/3/2013.  Impression  IMPRESSION: Osteopenia.  FINDINGS:  Bone Density:  -----------------------------------------------------------------  Region BMD T-score Z-score Classification  -----------------------------------------------------------------  AP Spine(L1-L4) 1.007 -0.4 2.0 Normal  Femoral Neck (Left) 0.695 -1.4 0.7 Osteopenia  Total Hip (Left) 0.901 -0.3 1.4 Normal    The patient indicates understanding of these issues and agrees to the plan.  The patient is asked to return in 1 year for AHA  [facility] Risk/Benefits counseling performed    SUGGESTED VACCINATIONS - Influenza, Pneumococcal, Zoster, Tetanus  Influenza:  No recommendations at this time  Pneumonia: No recommendations at this time  Shingrix shingles vaccine is due  Electronically signed by Edward Morris MD at 09/20/2024 9:21 AM CDT   Plan of Treatment  - documented as of this encounter  Plan of Treatment - Upcoming Encounters  Upcoming Encounters  Date Type Department Care Team (Latest Contact Info) Description   09/30/2024 9:00 AM CDT Office Visit Surgical Procedure Visit   [phone]  Shaji Carver MD   [address] [place] [address]   [address]   [place] [address] [phone] (Work)   [phone] (Fax)        Plan of Treatment - Scheduled Orders  Scheduled Orders  Name Type Priority Associated Diagnoses Order Schedule   ELECTROCARDIOGRAM, COMPLETE ECG Routine Pre-op exam  Ordered: 09/11/2024   ROSSANA SCREEN MAMMOGRAM, DIGITAL (CPT=77067) Imaging Routine Encounter for screening mammogram for malignant neoplasm of breast  Expected: 09/11/2024 (Approximate), Expires: 09/11/2025     Procedures  - documented in this encounter  Procedures  Procedure Name Priority Date/Time Associated Diagnosis Comments   [place] PPPS, SUBSEQ VISIT Routine 09/20/2024 9:20 AM CDT Routine health maintenance      THYROID- STIMULATION HORMONE (TSH) Routine 09/11/2024 11:21 AM CDT Hypothyroidism, unspecified type  Results for this procedure are in the results section.      Lab Results  - documented in this encounter  Table of Contents for Lab Results   THYROID-STIMULATING IMMUNOGLOBULIN (TSI) (09/11/2024 11:21 AM CDT)   THYROID PEROXIDASE (TPO) ANTIBODY (09/11/2024 11:21 AM CDT)   FREE T4 (FREE THYROXINE) (09/11/2024 11:21 AM CDT)   THYROID- STIMULATION HORMONE (TSH) (09/11/2024 11:21 AM CDT)      THYROID-STIMULATING IMMUNOGLOBULIN (TSI) (09/11/2024 11:21 AM CDT)  Lab Results - THYROID-STIMULATING IMMUNOGLOBULIN (TSI) (09/11/2024 11:21 AM CDT)  Component Value Ref Range Test Method Analysis Time Performed At Pathologist Signature   Tsi (Thyroid Stimulating Immunoglobulin) <89 <140 % baseline    09/18/2024 5:06 PM CDT [facility]     Comment:    Thyroid stimulating immunoglobulins (TSI) can engage the TSH  receptors resulting in hyperthyroidism in Graves' disease  patients. TSI levels can be useful in monitoring the  clinical outcome of Graves' disease as well as assessing the  potential for hyperthyroidism from maternal-fetal transfer.  TSI results greater than or equal to (>=) 140% of the  Reference Control are considered positive.      NOTE: A serum TSH level greater than 350 micro-International  Units/mL can interfere with the TSI bioassay and potentially  give false positive results.    Patients who are pregnant and are suspected of having  hyperthyroidism should have both TSI and human Chorionic  Gonadotropin (hCG) tests measured. A serum hCG level greater  than 40,625 mIU/mL can interfere with the TSI bioassay and  may give false negative results. In these patients it is  recommended that a second TSI be obtained when the hCG  concentration falls below 40,625 mIU/mL (usually after  approximately 20-weeks gestation).    The analytical performance characteristics of this assay  have been determined by [facility]. The modifications have not been cleared  or approved by the FDA. This assay has been validated  pursuant to the CLIA regulations and is used for clinical  purposes.     Lab Results - THYROID-STIMULATING IMMUNOGLOBULIN (TSI) (09/11/2024 11:21 AM CDT)  Specimen (Source) Anatomical Location / Laterality Collection Method / Volume Collection Time Received Time   Blood   Venipuncture / Unknown 09/11/2024 11:21 AM CDT 09/11/2024 11:21 AM CDT     Lab Results - THYROID-STIMULATING IMMUNOGLOBULIN (TSI) (09/11/2024 11:21 AM CDT)  [facility]   [facility] - 09/18/2024 5:06 PM CDT   Performing Organization Information:            [facility] [place],      [address] [place]  [address]      Aurea Goncalves MD,PhD,ALIZA      Lab Results - THYROID-STIMULATING IMMUNOGLOBULIN (TSI) (09/11/2024 11:21 AM CDT)  Authorizing Provider Result Type   Edward Morris MD LAB BLOOD ORDERABLES     Lab Results - THYROID-STIMULATING IMMUNOGLOBULIN (TSI) (09/11/2024 11:21 AM CDT)  Performing Organization Address City/State/ZIP Code Phone Number   [facility]             Back to top of Lab Results       THYROID PEROXIDASE (TPO) ANTIBODY (09/11/2024 11:21 AM CDT)  Lab Results - THYROID PEROXIDASE (TPO) ANTIBODY (09/11/2024 11:21 AM CDT)  Component Value Ref Range Test Method Analysis Time Performed At Pathologist Signature   Thyroid Peroxidase Antibodies 1 <9 IU/mL   09/12/2024 12:38 PM CDT [facility]       Lab Results - THYROID PEROXIDASE (TPO) ANTIBODY (09/11/2024 11:21 AM CDT)  Specimen (Source) Anatomical Location / Laterality Collection Method / Volume Collection Time Received Time   Blood   Venipuncture / Unknown 09/11/2024 11:21 AM CDT 09/11/2024 11:21 AM CDT     Lab Results - THYROID PEROXIDASE (TPO) ANTIBODY (09/11/2024 11:21 AM CDT)  Narrative   [facility] - 09/12/2024 12:38 PM CDT   Performing Organization Information:            [facility] [address]      Gabriel Bee      Lab Results - THYROID PEROXIDASE (TPO) ANTIBODY (09/11/2024 11:21 AM CDT)  Authorizing Provider Result Type   Edward Morris MD LAB BLOOD ORDERABLES     Lab Results - THYROID PEROXIDASE (TPO) ANTIBODY (09/11/2024 11:21 AM CDT)  Performing Organization Address City/State/ZIP Code Phone Number   [facility]             Back to top of Lab Results       FREE T4 (FREE THYROXINE) (09/11/2024 11:21 AM CDT)  Lab Results - FREE T4 (FREE THYROXINE) (09/11/2024 11:21 AM CDT)  Component Value Ref Range Test Method Analysis Time Performed At Pathologist Signature   Free T4 1.04 0.93 - 1.70 ng/dL   09/11/2024 12:27 PM CDT [facility]       Lab Results - FREE T4 (FREE THYROXINE) (09/11/2024 11:21 AM CDT)  Specimen (Source) Anatomical Location / Laterality Collection Method / Volume Collection Time Received Time   Blood   Venipuncture / Unknown 09/11/2024 11:21 AM CDT 09/11/2024 11:21 AM CDT     Lab Results - FREE T4 (FREE THYROXINE) (09/11/2024 11:21 AM CDT)  Narrative         Lab Results - FREE T4 (FREE THYROXINE) (09/11/2024 11:21 AM CDT)  Authorizing Provider Result Type   Edward Morris MD LAB BLOOD ORDERABLES     Lab Results - FREE T4 (FREE THYROXINE) (09/11/2024 11:21 AM CDT)  Performing Organization Address City/State/ZIP Code Phone Number   [facility]  [id number]   [address]  [phone]       Back to top of Lab Results       THYROID- STIMULATION HORMONE (TSH) (09/11/2024 11:21 AM CDT)  Lab Results - THYROID- STIMULATION HORMONE (TSH) (09/11/2024 11:21 AM CDT)  Component Value Ref Range Test Method Analysis Time Performed At [facility]   TSH 2.580 0.270 - 4.200 mIU/L   09/11/2024 12:27 PM CDT [facility]     Comment:    TSH levels vary during pregnancy, depending on a variety of factors.    The American Thyroid Association recommends the following treatment for hypothyroidism:  In the first trimester when TSH is > 10  mIU/L  In the absence of TPO antibodies  TSH is > 4 in the presence of TPO antibodies      The American Thyroid Association does not recommend treatment when:  TSH values are below 2.5 mIU/L in the first trimester      Evaluation and correlation with clinical parameters for each patient is recommended to determine individual treatment selections, especially with intermediate TSH values (2.5-10 w/o TPO Ab and 2.5 - 4 w/TPO Ab)     Lab Results - THYROID- STIMULATION HORMONE (TSH) (09/11/2024 11:21 AM CDT)  Specimen (Source) Anatomical Location / Laterality Collection Method / Volume Collection Time Received Time   Blood   Venipuncture / Unknown  09/11/2024 11:21 AM CDT 09/11/2024 11:21 AM CDT     Lab Results - THYROID- STIMULATION HORMONE (TSH) (09/11/2024 11:21 AM CDT)  Narrative         Lab Results - THYROID- STIMULATION HORMONE (TSH) (09/11/2024 11:21 AM CDT)  Authorizing Provider Result Type   Edward Morris MD LAB BLOOD ORDERABLES     Lab Results - THYROID- STIMULATION HORMONE (TSH) (09/11/2024 11:21 AM CDT)  Performing Organization Address City/State/ZIP Code Phone Number   [employer]  [address]   [address] [place] [address]  [phone]       Back to top of Lab Results  Visit Diagnoses  - documented in this encounter  Visit Diagnoses  Diagnosis   Pre-op exam - Primary   Preoperative examination, unspecified    Routine health maintenance   Routine general medical examination at a health care facility    Stage 3a chronic kidney disease (HCC)    Hypothyroidism, unspecified type    Recurrent major depressive disorder, in full remission (HCC)    Aortic atherosclerosis (HCC)   Atherosclerosis of aorta    Coronary artery disease involving native coronary artery of native heart without angina pectoris    Hyperlipidemia, unspecified hyperlipidemia type    Encounter for screening mammogram for malignant neoplasm of breast   Other screening mammogram    Osteopenia of multiple sites      Discontinued Medications  - documented as of this encounter  Discontinued Medications  Medication Sig Discontinue Reason Start Date End Date   atorvastatin 20 MG Oral Tab  Take 1 tablet (20 mg total) by mouth once daily. Patient discontinued 04/18/2023 09/11/2024   Fenofibrate 54 MG Oral Tab  Take 1 tablet (54 mg total) by mouth daily. Patient discontinued 04/18/2023 09/11/2024   thyroid (ARMOUR THYROID) 120 MG Oral Tab  Take 1 tablet (120 mg total) by mouth daily. Patient discontinued 04/18/2023 09/11/2024     Orders  - documented in this encounter  Orders  PROCEDURES Count Last Ordered Date First Ordered Date   [place] PPPS, SUBSEQ VISIT 1 09/20/2024        Additional Health Concerns  - documented as of this encounter  Additional Health Concerns  Assessment Noted Time   A fall risk assessment has been completed for the patient 09/11/2024 10:00 AM CDT     Care Teams  - documented as of this encounter  Care Teams  Team Member Relationship Specialty Start Date End Date   Edward Morris MD   NPI: [id number]   [address] [place] [address]   [phone] (Work)   [phone] (Fax)  PCP - General   1/25/11       Patient Demographics    Patient Demographics  Patient Address Patient Name Communication   [address] (Home)  [place] [place] [address]    (Jul. 10, 2017 - ):  [address] (Home)  [place] [address] Chloe Wall    Former / Aliases:  Rachel Wall [phone] (Mobile)  [phone] (Home)  [email or web address]     Patient Demographics  Language Race / Ethnicity Marital Status   English (Preferred) White / Not  or       Patient Contacts    Patient Contacts  Contact Name Contact Address Communication Relationship to Patient   Jovani Pachecodulcetenzin [address] [place] [address] [phone] (Home)  [phone] (Mobile) Spouse, Emergency Contact   Aleksandra Cobb Unknown [phone] (Work)  [phone] (Mobile) Daughter, Emergency Contact     Document Information    Primary Care Provider Other Service Providers Document Coverage Dates   Edward Morris MD (Jan. 25, 2011January 25, 2011 - Present)  NPI: [id number]  [phone] (Work)  [phone] (Fax)  [address] [place] [address]  Critical access hospital  [address] [place] [address]  Sep. 11, 2024September 11, 2024      Organization   [facility]  [address] [place] [address]     Encounter Providers Encounter Date   Edward Morris MD (Attending)  NPI: [id number]  [phone] (Work)  [phone] (Fax)  [address] [place] [address]  Family Medicine Sep. 11, 2024September  11, 2024     Legal Authenticator    Myles Him Director           ADDENDUM:    The above referenced H&P was reviewed by Shaji Carver MD on 9/30/2024, the patient was examined and no significant changes have occurred in the patient's condition since the H&P was performed.  I discussed with the patient and/or legal representative the potential benefits, risks and side effects of this procedure; the likelihood of the patient achieving goals; and potential problems that might occur during recuperation.  I discussed reasonable alternatives to the procedure, including risks, benefits and side effects related to the alternatives and risks related to not receiving this procedure.  We will proceed with procedure as planned.

## 2024-09-29 ENCOUNTER — ANESTHESIA EVENT (OUTPATIENT)
Dept: SURGERY | Facility: HOSPITAL | Age: 79
End: 2024-09-29
Payer: MEDICARE

## 2024-09-29 NOTE — DISCHARGE INSTRUCTIONS
Sometimes managing your health at home requires assistance.  The [facility] team has recognized your preference to use [facility], [facility].  They can be reached by phone at [phone].  The fax number for your reference is [phone].  A representative from the home health agency will contact you or your family to schedule your first visit.      Please verify or make your first post operative appointment by calling your surgeon's office.  Appointment should be within next 10-21 days.     Call your surgeon's office if:  You have fever over 101.  Wound looks very red.  You have calf pain.    If you have chest pain or shortness of breath, call 911.    Move slowly and have your caregiver next to you for the first few days as you sit up from supine or stand from sitting.  You may feel lightheaded and need support.  Drink plenty of fluids to keep yourself hydrated.  You may put as much weight on operative leg as you feel comfortable.  You may wean off assistive walking device as you feel comfortable doing so under your therapist's supervision.    Wear your compressive sleeve during day time until follow up appointment.  Elevate and ice to control swelling.  Take 10 deep breaths every hour you are awake to keep your lungs expanded.  Perform active ankle pumps 10 times every hour you are awake.  You are encouraged to walk around your first floor living space every 1-2 hours while you are awake.  Start dressing change in one week from date of surgery.   Change dressing daily.    Please focus on range of motion of your knee.   Please achieve at least 0-90 degrees motion by your first postoperative appointment.    Medications:  Aspirin 81 mg:  take one tab twice daily to prevent blood clot.    Colace is a stool softener.  Please take twice daily.  Extra Strength Tylenol.  Take 2 tabs (1000mg) 4 times daily.  (maximum 4000mg daily)  Tramadol is a pain medication for moderate pain.   Use as needed per instruction.  Oxycodone is a pain medication for severe pain.  Use as needed per instruction  Do not take Tramadol and Oxydone at the same time.  Space out at least one hour.    Spanda- hip replacement(single layer compression sleeve):     All patients should wear the compression sleeve until first follow up visit to surgeon’s office (about 2-3 weeks) and then check with surgeon if need to continue use.  Take off to shower. Best to keep on as much as possible, even at night, except when washing out.  Wash with mild soap and water; DRIP dry overnight- put back on before getting up for the day.  Use one long tube on the calf.   It should end up just below the back of the knee and the other end on the ball of the foot to expose the toes.   Makes sure it is NOT bunched up anywhere.  IF the Spanda- feels TOO tight, then REMOVE it and call your surgeon to let them know.

## 2024-09-30 ENCOUNTER — APPOINTMENT (OUTPATIENT)
Dept: GENERAL RADIOLOGY | Facility: HOSPITAL | Age: 79
End: 2024-09-30
Attending: ORTHOPAEDIC SURGERY
Payer: MEDICARE

## 2024-09-30 ENCOUNTER — HOSPITAL ENCOUNTER (OUTPATIENT)
Facility: HOSPITAL | Age: 79
Discharge: HOME HEALTH | End: 2024-10-01
Attending: ORTHOPAEDIC SURGERY | Admitting: ORTHOPAEDIC SURGERY
Payer: MEDICARE

## 2024-09-30 ENCOUNTER — ANESTHESIA (OUTPATIENT)
Dept: SURGERY | Facility: HOSPITAL | Age: 79
End: 2024-09-30
Payer: MEDICARE

## 2024-09-30 DIAGNOSIS — M16.11 OSTEOARTHRITIS OF RIGHT HIP: Primary | ICD-10-CM

## 2024-09-30 DIAGNOSIS — Z01.818 PRE-OP TESTING: ICD-10-CM

## 2024-09-30 PROCEDURE — 88304 TISSUE EXAM BY PATHOLOGIST: CPT | Performed by: ORTHOPAEDIC SURGERY

## 2024-09-30 PROCEDURE — 97530 THERAPEUTIC ACTIVITIES: CPT

## 2024-09-30 PROCEDURE — 0SR903Z REPLACEMENT OF RIGHT HIP JOINT WITH CERAMIC SYNTHETIC SUBSTITUTE, OPEN APPROACH: ICD-10-PCS | Performed by: ORTHOPAEDIC SURGERY

## 2024-09-30 PROCEDURE — 88311 DECALCIFY TISSUE: CPT | Performed by: ORTHOPAEDIC SURGERY

## 2024-09-30 PROCEDURE — 97161 PT EVAL LOW COMPLEX 20 MIN: CPT

## 2024-09-30 PROCEDURE — 76000 FLUOROSCOPY <1 HR PHYS/QHP: CPT | Performed by: ORTHOPAEDIC SURGERY

## 2024-09-30 DEVICE — EMPHASYS ACETABULAR SHELL THREE-HOLE 50MM CEMENTLESS
Type: IMPLANTABLE DEVICE | Site: HIP | Status: FUNCTIONAL
Brand: EMPHASYS

## 2024-09-30 DEVICE — ACTIS DUOFIX HIP PROSTHESIS (FEMORAL STEM 12/14 TAPER CEMENTLESS SIZE 5 HIGH COLLAR)  CE
Type: IMPLANTABLE DEVICE | Site: HIP | Status: FUNCTIONAL
Brand: ACTIS

## 2024-09-30 DEVICE — BIOLOX DELTA CERAMIC FEMORAL HEAD +1.5 36MM DIA 12/14 TAPER
Type: IMPLANTABLE DEVICE | Site: HIP | Status: FUNCTIONAL
Brand: BIOLOX DELTA

## 2024-09-30 DEVICE — EMPHASYS POLYETHYLENE LINER AOX NEUTRAL 50MM 36MM
Type: IMPLANTABLE DEVICE | Site: HIP | Status: FUNCTIONAL
Brand: EMPHASYS

## 2024-09-30 RX ORDER — ACETAMINOPHEN 500 MG
1000 TABLET ORAL EVERY 8 HOURS SCHEDULED
Status: DISCONTINUED | OUTPATIENT
Start: 2024-09-30 | End: 2024-10-01

## 2024-09-30 RX ORDER — ONDANSETRON 2 MG/ML
4 INJECTION INTRAMUSCULAR; INTRAVENOUS EVERY 6 HOURS PRN
Status: DISCONTINUED | OUTPATIENT
Start: 2024-09-30 | End: 2024-10-01

## 2024-09-30 RX ORDER — OXYCODONE HYDROCHLORIDE 5 MG/1
5 TABLET ORAL EVERY 4 HOURS PRN
Status: DISCONTINUED | OUTPATIENT
Start: 2024-09-30 | End: 2024-10-01

## 2024-09-30 RX ORDER — HYDROMORPHONE HYDROCHLORIDE 1 MG/ML
0.8 INJECTION, SOLUTION INTRAMUSCULAR; INTRAVENOUS; SUBCUTANEOUS EVERY 2 HOUR PRN
Status: DISCONTINUED | OUTPATIENT
Start: 2024-09-30 | End: 2024-10-01

## 2024-09-30 RX ORDER — DIPHENHYDRAMINE HYDROCHLORIDE 50 MG/ML
12.5 INJECTION INTRAMUSCULAR; INTRAVENOUS EVERY 4 HOURS PRN
Status: DISCONTINUED | OUTPATIENT
Start: 2024-09-30 | End: 2024-10-01

## 2024-09-30 RX ORDER — TRANEXAMIC ACID 10 MG/ML
1000 INJECTION, SOLUTION INTRAVENOUS ONCE
Status: COMPLETED | OUTPATIENT
Start: 2024-09-30 | End: 2024-09-30

## 2024-09-30 RX ORDER — LABETALOL HYDROCHLORIDE 5 MG/ML
5 INJECTION, SOLUTION INTRAVENOUS EVERY 5 MIN PRN
Status: DISCONTINUED | OUTPATIENT
Start: 2024-09-30 | End: 2024-09-30 | Stop reason: HOSPADM

## 2024-09-30 RX ORDER — BISACODYL 10 MG
10 SUPPOSITORY, RECTAL RECTAL
Status: DISCONTINUED | OUTPATIENT
Start: 2024-09-30 | End: 2024-10-01

## 2024-09-30 RX ORDER — DEXAMETHASONE SODIUM PHOSPHATE 10 MG/ML
8 INJECTION, SOLUTION INTRAMUSCULAR; INTRAVENOUS ONCE
Status: COMPLETED | OUTPATIENT
Start: 2024-10-01 | End: 2024-10-01

## 2024-09-30 RX ORDER — SODIUM CHLORIDE, SODIUM LACTATE, POTASSIUM CHLORIDE, CALCIUM CHLORIDE 600; 310; 30; 20 MG/100ML; MG/100ML; MG/100ML; MG/100ML
INJECTION, SOLUTION INTRAVENOUS CONTINUOUS
Status: DISCONTINUED | OUTPATIENT
Start: 2024-09-30 | End: 2024-09-30 | Stop reason: HOSPADM

## 2024-09-30 RX ORDER — METOCLOPRAMIDE HYDROCHLORIDE 5 MG/ML
INJECTION INTRAMUSCULAR; INTRAVENOUS AS NEEDED
Status: DISCONTINUED | OUTPATIENT
Start: 2024-09-30 | End: 2024-09-30 | Stop reason: SURG

## 2024-09-30 RX ORDER — METOCLOPRAMIDE HYDROCHLORIDE 5 MG/ML
10 INJECTION INTRAMUSCULAR; INTRAVENOUS EVERY 8 HOURS PRN
Status: DISCONTINUED | OUTPATIENT
Start: 2024-09-30 | End: 2024-10-01

## 2024-09-30 RX ORDER — ACETAMINOPHEN 500 MG
1000 TABLET ORAL ONCE
Status: DISCONTINUED | OUTPATIENT
Start: 2024-09-30 | End: 2024-09-30 | Stop reason: HOSPADM

## 2024-09-30 RX ORDER — ONDANSETRON 2 MG/ML
INJECTION INTRAMUSCULAR; INTRAVENOUS AS NEEDED
Status: DISCONTINUED | OUTPATIENT
Start: 2024-09-30 | End: 2024-09-30 | Stop reason: SURG

## 2024-09-30 RX ORDER — FERROUS SULFATE 325(65) MG
325 TABLET, DELAYED RELEASE (ENTERIC COATED) ORAL
Status: DISCONTINUED | OUTPATIENT
Start: 2024-10-01 | End: 2024-10-01

## 2024-09-30 RX ORDER — FAMOTIDINE 20 MG/1
20 TABLET, FILM COATED ORAL 2 TIMES DAILY
Status: DISCONTINUED | OUTPATIENT
Start: 2024-09-30 | End: 2024-10-01

## 2024-09-30 RX ORDER — ACETAMINOPHEN 500 MG
500 TABLET ORAL 4 TIMES DAILY
COMMUNITY
Start: 2024-09-30

## 2024-09-30 RX ORDER — POLYETHYLENE GLYCOL 3350 17 G/17G
17 POWDER, FOR SOLUTION ORAL DAILY PRN
Status: DISCONTINUED | OUTPATIENT
Start: 2024-09-30 | End: 2024-10-01

## 2024-09-30 RX ORDER — HYDROMORPHONE HYDROCHLORIDE 1 MG/ML
0.4 INJECTION, SOLUTION INTRAMUSCULAR; INTRAVENOUS; SUBCUTANEOUS EVERY 2 HOUR PRN
Status: DISCONTINUED | OUTPATIENT
Start: 2024-09-30 | End: 2024-10-01

## 2024-09-30 RX ORDER — FAMOTIDINE 10 MG/ML
20 INJECTION, SOLUTION INTRAVENOUS 2 TIMES DAILY
Status: DISCONTINUED | OUTPATIENT
Start: 2024-09-30 | End: 2024-10-01

## 2024-09-30 RX ORDER — MEPERIDINE HYDROCHLORIDE 25 MG/ML
INJECTION INTRAMUSCULAR; INTRAVENOUS; SUBCUTANEOUS
Status: COMPLETED
Start: 2024-09-30 | End: 2024-09-30

## 2024-09-30 RX ORDER — MIDAZOLAM HYDROCHLORIDE 1 MG/ML
INJECTION INTRAMUSCULAR; INTRAVENOUS AS NEEDED
Status: DISCONTINUED | OUTPATIENT
Start: 2024-09-30 | End: 2024-09-30 | Stop reason: SURG

## 2024-09-30 RX ORDER — OXYCODONE HYDROCHLORIDE 10 MG/1
10 TABLET ORAL EVERY 4 HOURS PRN
Status: DISCONTINUED | OUTPATIENT
Start: 2024-09-30 | End: 2024-10-01

## 2024-09-30 RX ORDER — SENNOSIDES 8.6 MG
17.2 TABLET ORAL NIGHTLY
Status: DISCONTINUED | OUTPATIENT
Start: 2024-09-30 | End: 2024-10-01

## 2024-09-30 RX ORDER — LIDOCAINE HYDROCHLORIDE 10 MG/ML
INJECTION, SOLUTION EPIDURAL; INFILTRATION; INTRACAUDAL; PERINEURAL AS NEEDED
Status: DISCONTINUED | OUTPATIENT
Start: 2024-09-30 | End: 2024-09-30 | Stop reason: SURG

## 2024-09-30 RX ORDER — DIPHENHYDRAMINE HYDROCHLORIDE 50 MG/ML
12.5 INJECTION INTRAMUSCULAR; INTRAVENOUS AS NEEDED
Status: DISCONTINUED | OUTPATIENT
Start: 2024-09-30 | End: 2024-09-30 | Stop reason: HOSPADM

## 2024-09-30 RX ORDER — METOCLOPRAMIDE HYDROCHLORIDE 5 MG/ML
10 INJECTION INTRAMUSCULAR; INTRAVENOUS EVERY 8 HOURS PRN
Status: DISCONTINUED | OUTPATIENT
Start: 2024-09-30 | End: 2024-09-30 | Stop reason: HOSPADM

## 2024-09-30 RX ORDER — ACETAMINOPHEN 325 MG/1
650 TABLET ORAL ONCE
Status: COMPLETED | OUTPATIENT
Start: 2024-09-30 | End: 2024-09-30

## 2024-09-30 RX ORDER — DIPHENHYDRAMINE HCL 25 MG
25 CAPSULE ORAL EVERY 4 HOURS PRN
Status: DISCONTINUED | OUTPATIENT
Start: 2024-09-30 | End: 2024-10-01

## 2024-09-30 RX ORDER — SODIUM PHOSPHATE, DIBASIC AND SODIUM PHOSPHATE, MONOBASIC 7; 19 G/230ML; G/230ML
1 ENEMA RECTAL ONCE AS NEEDED
Status: DISCONTINUED | OUTPATIENT
Start: 2024-09-30 | End: 2024-10-01

## 2024-09-30 RX ORDER — OXYCODONE HYDROCHLORIDE 5 MG/1
1 TABLET ORAL EVERY 6 HOURS PRN
COMMUNITY
Start: 2024-09-30

## 2024-09-30 RX ORDER — ASPIRIN 81 MG/1
81 TABLET ORAL 2 TIMES DAILY
COMMUNITY
Start: 2024-09-30

## 2024-09-30 RX ORDER — ACETAMINOPHEN 325 MG/1
TABLET ORAL
Status: COMPLETED
Start: 2024-09-30 | End: 2024-09-30

## 2024-09-30 RX ORDER — SODIUM CHLORIDE, SODIUM LACTATE, POTASSIUM CHLORIDE, CALCIUM CHLORIDE 600; 310; 30; 20 MG/100ML; MG/100ML; MG/100ML; MG/100ML
INJECTION, SOLUTION INTRAVENOUS CONTINUOUS
Status: DISCONTINUED | OUTPATIENT
Start: 2024-09-30 | End: 2024-10-01

## 2024-09-30 RX ORDER — DEXAMETHASONE SODIUM PHOSPHATE 4 MG/ML
VIAL (ML) INJECTION AS NEEDED
Status: DISCONTINUED | OUTPATIENT
Start: 2024-09-30 | End: 2024-09-30 | Stop reason: SURG

## 2024-09-30 RX ORDER — ONDANSETRON 2 MG/ML
4 INJECTION INTRAMUSCULAR; INTRAVENOUS EVERY 6 HOURS PRN
Status: DISCONTINUED | OUTPATIENT
Start: 2024-09-30 | End: 2024-09-30 | Stop reason: HOSPADM

## 2024-09-30 RX ORDER — TRAMADOL HYDROCHLORIDE 50 MG/1
50 TABLET ORAL EVERY 6 HOURS SCHEDULED
Status: DISCONTINUED | OUTPATIENT
Start: 2024-09-30 | End: 2024-10-01

## 2024-09-30 RX ORDER — MEPERIDINE HYDROCHLORIDE 25 MG/ML
12.5 INJECTION INTRAMUSCULAR; INTRAVENOUS; SUBCUTANEOUS AS NEEDED
Status: DISCONTINUED | OUTPATIENT
Start: 2024-09-30 | End: 2024-09-30 | Stop reason: HOSPADM

## 2024-09-30 RX ORDER — ASPIRIN 81 MG/1
81 TABLET ORAL 2 TIMES DAILY
Status: DISCONTINUED | OUTPATIENT
Start: 2024-09-30 | End: 2024-10-01

## 2024-09-30 RX ORDER — HYDROMORPHONE HYDROCHLORIDE 1 MG/ML
0.4 INJECTION, SOLUTION INTRAMUSCULAR; INTRAVENOUS; SUBCUTANEOUS EVERY 5 MIN PRN
Status: DISCONTINUED | OUTPATIENT
Start: 2024-09-30 | End: 2024-09-30 | Stop reason: HOSPADM

## 2024-09-30 RX ORDER — HYDROMORPHONE HYDROCHLORIDE 1 MG/ML
0.2 INJECTION, SOLUTION INTRAMUSCULAR; INTRAVENOUS; SUBCUTANEOUS EVERY 5 MIN PRN
Status: DISCONTINUED | OUTPATIENT
Start: 2024-09-30 | End: 2024-09-30 | Stop reason: HOSPADM

## 2024-09-30 RX ORDER — PSEUDOEPHEDRINE HCL 30 MG
100 TABLET ORAL 2 TIMES DAILY
COMMUNITY
Start: 2024-09-30

## 2024-09-30 RX ORDER — DOCUSATE SODIUM 100 MG/1
100 CAPSULE, LIQUID FILLED ORAL 2 TIMES DAILY
Status: DISCONTINUED | OUTPATIENT
Start: 2024-09-30 | End: 2024-10-01

## 2024-09-30 RX ORDER — NALOXONE HYDROCHLORIDE 0.4 MG/ML
80 INJECTION, SOLUTION INTRAMUSCULAR; INTRAVENOUS; SUBCUTANEOUS AS NEEDED
Status: DISCONTINUED | OUTPATIENT
Start: 2024-09-30 | End: 2024-09-30 | Stop reason: HOSPADM

## 2024-09-30 RX ORDER — DIPHENHYDRAMINE HYDROCHLORIDE 50 MG/ML
25 INJECTION INTRAMUSCULAR; INTRAVENOUS ONCE AS NEEDED
Status: ACTIVE | OUTPATIENT
Start: 2024-09-30 | End: 2024-09-30

## 2024-09-30 RX ORDER — HYDROMORPHONE HYDROCHLORIDE 1 MG/ML
0.6 INJECTION, SOLUTION INTRAMUSCULAR; INTRAVENOUS; SUBCUTANEOUS EVERY 5 MIN PRN
Status: DISCONTINUED | OUTPATIENT
Start: 2024-09-30 | End: 2024-09-30 | Stop reason: HOSPADM

## 2024-09-30 RX ORDER — BUPIVACAINE HYDROCHLORIDE 7.5 MG/ML
INJECTION, SOLUTION INTRASPINAL AS NEEDED
Status: DISCONTINUED | OUTPATIENT
Start: 2024-09-30 | End: 2024-09-30 | Stop reason: SURG

## 2024-09-30 RX ADMIN — MIDAZOLAM HYDROCHLORIDE 2 MG: 1 INJECTION INTRAMUSCULAR; INTRAVENOUS at 11:04:00

## 2024-09-30 RX ADMIN — SODIUM CHLORIDE, SODIUM LACTATE, POTASSIUM CHLORIDE, CALCIUM CHLORIDE: 600; 310; 30; 20 INJECTION, SOLUTION INTRAVENOUS at 12:16:00

## 2024-09-30 RX ADMIN — TRANEXAMIC ACID 1000 MG: 10 INJECTION, SOLUTION INTRAVENOUS at 11:14:00

## 2024-09-30 RX ADMIN — ONDANSETRON 4 MG: 2 INJECTION INTRAMUSCULAR; INTRAVENOUS at 11:04:00

## 2024-09-30 RX ADMIN — SODIUM CHLORIDE, SODIUM LACTATE, POTASSIUM CHLORIDE, CALCIUM CHLORIDE: 600; 310; 30; 20 INJECTION, SOLUTION INTRAVENOUS at 11:43:00

## 2024-09-30 RX ADMIN — METOCLOPRAMIDE HYDROCHLORIDE 10 MG: 5 INJECTION INTRAMUSCULAR; INTRAVENOUS at 11:04:00

## 2024-09-30 RX ADMIN — BUPIVACAINE HYDROCHLORIDE 1.6 ML: 7.5 INJECTION, SOLUTION INTRASPINAL at 11:10:00

## 2024-09-30 RX ADMIN — DEXAMETHASONE SODIUM PHOSPHATE 8 MG: 4 MG/ML VIAL (ML) INJECTION at 12:19:00

## 2024-09-30 RX ADMIN — LIDOCAINE HYDROCHLORIDE 50 MG: 10 INJECTION, SOLUTION EPIDURAL; INFILTRATION; INTRACAUDAL; PERINEURAL at 11:17:00

## 2024-09-30 RX ADMIN — SODIUM CHLORIDE, SODIUM LACTATE, POTASSIUM CHLORIDE, CALCIUM CHLORIDE: 600; 310; 30; 20 INJECTION, SOLUTION INTRAVENOUS at 11:02:00

## 2024-09-30 NOTE — CM/SW NOTE
09/30/24 1300   CM/SW Referral Data   Referral Source Physician   Reason for Referral Discharge planning   Informant EMR     P s/p THR.  Pre-op plan [facility]. Aidin sent to them.    Eugenia Munguia LCSW  /Discharge Planner

## 2024-09-30 NOTE — CONSULTS
[facility]   part of [facility]    Medical Consult     Chloe Wall Patient Status:  Outpatient in a Bed    1945 MRN [id number]   Location [facility] 3SW-A Attending Shaji Carver MD   Hosp Day # 0 PCP Edward Morris MD     Chief Complaint: Right hip primary osteoarthritis    History of Present Illness: Chloe Wall is a 79 year old female with history of kidney stones, CKD, fatty liver, diverticulosis, GERD, fibroids presenting with right hip primary osteoarthritis status post direct anterior right total hip replacement.  Patient denies any preoperative positive review of systems.  Patient denies any acute issues currently.  Patient's postoperative pain is controlled.    Past Medical History:  Past Medical History:    Anesthesia complication    TAKES MORE MEDICATION TO PUT UNDER / had horrible headache that lasted for days    Calculus of kidney    CKD (chronic kidney disease)    coronary artery disease    angiogram-minimal disease  [facility]    Disorder of liver    fatty liver    Disorder of thyroid    Diverticulosis    Esophageal reflux    Fibroids    Hearing impairment    lost hearing aids    High cholesterol    Hip problem    right hip , unsure of problem    Hx of motion sickness    hyperlipidemia    Osteoarthritis    Osteopenia        Past Surgical History:   Past Surgical History:   Procedure Laterality Date      ,1968,1970    Cholecystectomy  1990    Colonoscopy  2006    polyps  [facility]    Colonoscopy  2011    diverticlitis, [place]    Cystoscopy,insert ureteral stent  2024    Other surgical history  2011    laparoscopy hemicolectomy  [facility]       Social History:  reports that she quit smoking about 52 years ago. Her smoking use included cigarettes. She has never used smokeless tobacco. She reports current alcohol use. She reports that she does not use drugs. No illegal drugs, , 3 children, not working    Family History:    Family History   Problem Relation Age of Onset    Heart Disorder Father         CAD    Other Mother         staph infection, RA    Heart Disorder Paternal Grandmother     Heart Disorder Paternal Grandfather     Other Brother         TIA   Mother passed  Father passed  4 brothers living  1 sister living     Allergies:   Allergies   Allergen Reactions    Pcn [Penicillins] UNKNOWN     As a child    Sulfa Antibiotics RASH       Medications:    No current facility-administered medications on file prior to encounter.     Current Outpatient Medications on File Prior to Encounter   Medication Sig Dispense Refill    oxyCODONE 5 MG Oral Tab Take 1 tablet (5 mg total) by mouth every 6 (six) hours as needed.      acetaminophen 500 MG Oral Tab Take 1 tablet (500 mg total) by mouth 4 (four) times daily.      aspirin 81 MG Oral Tab EC Take 1 tablet (81 mg total) by mouth 2 (two) times daily.      docusate sodium 100 MG Oral Cap Take 100 mg by mouth 2 (two) times daily.      polyethylene glycol, PEG 3350, 17 g Oral Powd Pack Take 17 g by mouth daily.      aspirin-acetaminophen-caffeine 250-250-65 MG Oral Tab Take 1 tablet by mouth every 6 (six) hours as needed for Pain.      traMADol 50 MG Oral Tab Take 1-2 tablets ( mg total) by mouth every 6 (six) hours as needed for Pain. 10 tablet 0       Review of Systems:   A comprehensive 14 point review of systems was completed.    Pertinent positives and negatives noted in the HPI.    Physical Exam:    /46 (BP Location: Right arm)   Pulse 74   Temp 97.4 °F (36.3 °C) (Axillary)   Resp 14   Ht 5' 4.25\" (1.632 m)   Wt 188 lb 0.8 oz (85.3 kg)   SpO2 97%   BMI 32.03 kg/m²   General: No acute distress. Alert and oriented   HEENT: Normocephalic atraumatic. Moist mucous membranes. EOM-I.  Neck: No JVD. No carotid bruits.  Respiratory: Clear to auscultation bilaterally. No wheezes. No crackles  Cardiovascular: S1, S2. Regular rate and rhythm. No murmurs  Chest and Back: No  tenderness or deformity.  Abdomen: Soft, nontender, nondistended.  Positive bowel sounds. No rebound, guarding   Neurologic: No focal neurological deficits. CNII-XII grossly intact. Sensation and strength intact  Musculoskeletal: Moves all extremities.  Extremities: No significant pitting edema or tenderness of the LE  Integument: No new rashes or lesions.   Psychiatric: Appropriate mood and affect.      Diagnostic Data:      Labs:  No results for input(s): \"WBC\", \"HGB\", \"MCV\", \"PLT\", \"BAND\", \"INR\" in the last 168 hours.    Invalid input(s): \"LYM#\", \"MONO#\", \"BASOS#\", \"EOSIN#\"    No results for input(s): \"GLU\", \"BUN\", \"CREATSERUM\", \"GFRAA\", \"GFRNAA\", \"CA\", \"ALB\", \"NA\", \"K\", \"CL\", \"CO2\", \"ALKPHO\", \"AST\", \"ALT\", \"BILT\", \"TP\" in the last 168 hours.    CrCl cannot be calculated (Patient's most recent lab result is older than the maximum 7 days allowed.).    No results for input(s): \"PTP\", \"INR\" in the last 168 hours.    No results for input(s): \"TROP\", \"CK\" in the last 168 hours.    Imaging: Imaging data reviewed in Epic.      ASSESSMENT / PLAN:   79 year old female with history of kidney stones, CKD, fatty liver, diverticulosis, GERD, fibroids presenting with right hip primary osteoarthritis status post direct anterior right total hip replacement.    Right hip primary osteoarthritis   -POD # 0 status post direct anterior right total hip replacement.  -ortho following  -pt/ot    Post Operative Pain   -acetaminophen po atc  -decadron iv  -tramadol po atc  -hydromorphone iv prn  -oxy po prn   -tizanidine po prn     Quality:  DVT Prophylaxis: asa po bid  CODE status:   Gregory:     Plan of care discussed with patient and staff    Dispo: no discharge    Waqar Escalera MD  [facility]  [phone]

## 2024-09-30 NOTE — ANESTHESIA PROCEDURE NOTES
Spinal Block    Date/Time: 9/30/2024 11:06 AM    Performed by: Shade Garcia MD  Authorized by: Shade Garcia MD      General Information and Staff    Start Time:  9/30/2024 11:06 AM  End Time:  9/30/2024 11:10 AM  Anesthesiologist:  Shade Garcia MD  Performed by:  Anesthesiologist  Site identification: surface landmarks    Preanesthetic Checklist: patient identified, IV checked, risks and benefits discussed, monitors and equipment checked, pre-op evaluation, timeout performed, anesthesia consent and sterile technique used      Procedure Details    Patient Position:  Sitting  Prep: ChloraPrep    Monitoring:  Cardiac monitor, heart rate and continuous pulse ox  Approach:  Midline  Location:  L3-4  Injection Technique:  Single-shot    Needle    Needle Type:  Sprotte  Needle Gauge:  24 G  Needle Length:  3.5 in    Assessment    Sensory Level:  T10  Events: clear CSF, CSF aspirated, well tolerated and blood negative      Additional Comments     Spinal bupivacaine 0.75% 1.6ml easily injected after clear CSF aspiration

## 2024-09-30 NOTE — ANESTHESIA PREPROCEDURE EVALUATION
PRE-OP EVALUATION    Patient Name: Chloe Wall    Admit Diagnosis: RIGHT HIP OSTEOARTHRITIS    Pre-op Diagnosis: RIGHT HIP OSTEOARTHRITIS    RIGHT ANTERIOR TOTAL HIP REPLACEMENT    Anesthesia Procedure: RIGHT ANTERIOR TOTAL HIP REPLACEMENT (Right)    Surgeons and Role:     * Shaji Carver MD - Primary    Pre-op vitals reviewed.  Temp: 97 °F (36.1 °C)  Pulse: 87  Resp: 18  BP: 146/80  SpO2: 97 %  Body mass index is 32.03 kg/m².    Current medications reviewed.  Hospital Medications:   acetaminophen (Tylenol Extra Strength) tab 1,000 mg  1,000 mg Oral Once    lactated ringers infusion   Intravenous Continuous    tranexamic acid in sodium chloride 0.7% (Cyklokapron) 1000 mg/100mL infusion premix 1,000 mg  1,000 mg Intravenous Once    ceFAZolin (Ancef) 2g in 10mL IV syringe premix  2 g Intravenous Once    clonidine/epinephrine/ropivacaine/ketorolac in 0.9% NaCl 60 mL pain cocktail syringe for hip arthroplasty   Infiltration Once (Intra-Op)       Outpatient Medications:     Medications Prior to Admission   Medication Sig Dispense Refill Last Dose    oxyCODONE 5 MG Oral Tab Take 1 tablet (5 mg total) by mouth every 6 (six) hours as needed.   post op    acetaminophen 500 MG Oral Tab Take 1 tablet (500 mg total) by mouth 4 (four) times daily.   post op    aspirin 81 MG Oral Tab EC Take 1 tablet (81 mg total) by mouth 2 (two) times daily.   post op    docusate sodium 100 MG Oral Cap Take 100 mg by mouth 2 (two) times daily.   post op    polyethylene glycol, PEG 3350, 17 g Oral Powd Pack Take 17 g by mouth daily.   9/29/2024 at 0800    aspirin-acetaminophen-caffeine 250-250-65 MG Oral Tab Take 1 tablet by mouth every 6 (six) hours as needed for Pain.   9/16/2024    traMADol 50 MG Oral Tab Take 1-2 tablets ( mg total) by mouth every 6 (six) hours as needed for Pain. 10 tablet 0 More than a month       Allergies: Pcn [penicillins] and Sulfa antibiotics      Anesthesia Evaluation    Patient summary  reviewed.    Anesthetic Complications  (+) history of anesthetic complications  History of: PONV       GI/Hepatic/Renal      (+) GERD (c/o active reflux preop) and well controlled      (+) chronic renal disease (CKD)                    Cardiovascular      ECG reviewed.  Exercise tolerance: good     MET: >4           (+) CAD  (-) past MI  (-) CABG/stent              (-) angina     (-) GUPTA  (-) orthopnea  (-) PND     Endo/Other    Negative endo/other ROS.       (+) hypothyroidism                (+) arthritis       Pulmonary    Negative pulmonary ROS.           (-) shortness of breath  (-) recent URI          Neuro/Psych    Negative neuro/psych ROS.                                  Past Surgical History:   Procedure Laterality Date      1966,,1970    Cholecystectomy  1990    Colonoscopy  2006    polyps  [facility]    Colonoscopy  2011    diverticlitis, [facility]    Cystoscopy,insert ureteral stent  2024    Other surgical history  2011    [facility]     Social History     Socioeconomic History    Marital status:    Tobacco Use    Smoking status: Former     Current packs/day: 0.00     Types: Cigarettes     Quit date: 1972     Years since quittin.7    Smokeless tobacco: Never   Vaping Use    Vaping status: Never Used   Substance and Sexual Activity    Alcohol use: Yes     Comment: minimally 3 times a year    Drug use: No    Sexual activity: Yes     Partners: Male   Other Topics Concern    Exercise Yes     History   Drug Use No     Available pre-op labs reviewed.  Lab Results   Component Value Date    WBC 9.2 2024    RBC 5.53 (H) 2024    HGB 16.0 2024    HCT 46.7 2024    MCV 84.4 2024    MCH 28.9 2024    MCHC 34.3 2024    RDW 13.5 2024    .0 2024     Lab Results   Component Value Date     2024    K 4.2 2024     2024    CO2 23.0 2024    BUN 15 2024     CREATSERUM 1.03 (H) 09/19/2024    GLU 90 09/19/2024    CA 10.4 09/19/2024            Airway      Mallampati: II  Mouth opening: 3 FB  TM distance: 4 - 6 cm  Neck ROM: full Cardiovascular    Cardiovascular exam normal.  Rhythm: regular  Rate: normal  (-) murmur   Dental    Dentition appears grossly intact         Pulmonary    Pulmonary exam normal.  Breath sounds clear to auscultation bilaterally.        (-) wheezes       Other findings              Expand All Collapse All    PRE-OP EVALUATION     Patient Name: Chloe Wall     Admit Diagnosis: KIDNEY STONES     Pre-op Diagnosis: KIDNEY STONES     CYSTOSCOPY, RIGHT RETROGRADE PYELOGRAM, RIGHT URETEROSCOPY, RIGHT URETERAL STENT PLACEMENT, HOLMIUM LASER LITHOTRIPSY     Anesthesia Procedure: CYSTOSCOPY, RIGHT RETROGRADE PYELOGRAM, RIGHT URETEROSCOPY, RIGHT URETERAL STENT PLACEMENT, HOLMIUM LASER LITHOTRIPSY (Right)     Surgeons and Role:     * Robbin Esteban MD - Primary     Pre-op vitals reviewed.  Temp: 97.5 °F (36.4 °C)  Pulse: 69  Resp: 16  BP: 141/75  SpO2: 97 %  Body mass index is 32.7 kg/m².     Current medications reviewed.  Hospital Medications:  Current Medications    [Transfer Hold] acetaminophen (Tylenol Extra Strength) tab 1,000 mg  1,000 mg Oral Once    lactated ringers infusion   Intravenous Continuous    ceFAZolin (Ancef) 2g in 10mL IV syringe premix  2 g Intravenous Once            Outpatient Medications:     Prescriptions Prior to Admission           Medications Prior to Admission   Medication Sig Dispense Refill Last Dose    NON FORMULARY Diatomaceous earth -powder packet 1 tbsp bid     6/16/2024    thyroid 120 MG Oral Tab Take 1 tablet (120 mg total) by mouth daily.     More than a month    atorvastatin 20 MG Oral Tab Take 1 tablet (20 mg total) by mouth once daily. 90 tablet 1 More than a month    FENOFIBRATE 54 MG Oral Tab Take 1 tablet by mouth once daily 90 tablet 2 More than a month    aspirin 325 MG Oral Tab Take 1 tablet (325 mg  total) by mouth daily.     More than a month    TUMS OR PRN     6/15/2024            Allergies: Pcn [penicillins] and Sulfa antibiotics        Anesthesia Evaluation     Patient summary reviewed.     Anesthetic Complications  (+) history of anesthetic complications  History of: PONV          GI/Hepatic/Renal        (+) GERD (c/o active reflux preop) and poorly controlled        (+) chronic renal disease (CKD)                            Cardiovascular        ECG reviewed.  Exercise tolerance: good      MET: >4              (+) CAD  (-) past MI  (-) CABG/stent                 (-) angina      (-) GUPTA  (-) orthopnea  (-) PND       Endo/Other     Negative endo/other ROS.       (+) hypothyroidism                                 Pulmonary     Negative pulmonary ROS.              (-) shortness of breath  (-) recent URI           Neuro/Psych     Negative neuro/psych ROS.                                                Past Surgical History         Past Surgical History:   Procedure Laterality Date       ,,    Cholecystectomy       Colonoscopy        polyps  [facility]    Colonoscopy        diverticlitis, [facility]    Other surgical history        laparoscopy hemicolectomy  [facility]         Social Hx on file   Social History            Socioeconomic History    Marital status:    Tobacco Use    Smoking status: Former       Current packs/day: 0.00       Types: Cigarettes       Quit date: 1972       Years since quittin.5    Smokeless tobacco: Never   Vaping Use    Vaping status: Never Used   Substance and Sexual Activity    Alcohol use: Yes       Comment: minimally    Drug use: No    Sexual activity: Yes       Partners: Male   Other Topics Concern    Exercise Yes             History   Drug Use No      Available pre-op labs reviewed.        Airway        Mallampati: II  Mouth opening: 3 FB  TM distance: 4 - 6 cm  Neck ROM: full Cardiovascular     Cardiovascular exam normal.  Rhythm:  regular  Rate: normal  (-) murmur   Dental     Dentition appears grossly intact             Pulmonary     Pulmonary exam normal.  Breath sounds clear to auscultation bilaterally.           (-) wheezes         Other findings                      ECG rhythm:   Normal sinus     ----------------------------------------------------------------------------     Conclusions:     1. Left ventricle: The cavity size was normal. Wall thickness was normal.      Systolic function was normal. The estimated ejection fraction was 60-65%.      Left ventricular diastolic function parameters were normal for the      patient's age.   2. Left atrium: The left atrial volume was normal.   3. Pulmonary arteries: Systolic pressure was within the normal range,      estimated to be 25mm Hg.   Impressions:  No previous study from [facility] was   available for comparison.   *   Prepared and electronically signed by   Adrian Zhu MD   05/23/2023 16:13        ASA: 2   Plan: spinal  NPO status verified and patient meets guidelines.  Patient has not taken beta blockers in last 24 hours.      Comment: TJP  Plan/risks discussed with: patient  Use of blood product(s) discussed with: patient    Consented to blood products.      We discussed risks and benefits of spinal and GA and will opt for spinal w/GA as back up.  We discussed low probability of HA, back ache, failed block, high block.  We discussed PONV prophylaxis and analgesic plan including TJP.  The patient's questions were answered and she agrees to proceed.

## 2024-09-30 NOTE — ADDENDUM NOTE
Addendum  created 09/30/24 1349 by Shade Garcia MD    Intraprocedure Event edited, Intraprocedure Staff edited

## 2024-09-30 NOTE — OPERATIVE REPORT
[facility]    TOTAL HIP REPLACEMENT OPERATIVE REPORT    DATE OF SURGERY 9/30/2024    Chloe Wall       [id number]     9/20/1945    PRE-OP DX:  RIGHT HIP PRIMARY OSTEOARTHRITIS  POST-OP DX:  SAME  PROCEDURE:  DIRECT ANTERIOR RIGHT TOTAL HIP REPLACEMENT  SURGEON:  DONTE ECHEVARRIA MD  FIRST ASSIST: DERREK HUMPHREYS PA-C  ANESTHESIA: SPINAL  EBL:150 CC  COMPLICATIONS:  NONE  SPECIMEN:  FEMORAL HEAD  DRAIN: NONE   IMPLANT USED:   DEPUY ACTIS STEM 5 HO , EMPHASYS 3 HOLE ACETABULUM 50 mm,  NEUTRAL LINER, CERAMIC FEMORAL HEAD 36 BY 1.5,   PROCEDURE NOTE:  PATIENT WAS CORRECTLY IDENTIFIED AND OPERATIVE SITE WAS VERIFIED IN THE PREOPERATIVE HOLDING AREA.  PATIENT WAS BROUGHT TO THE OR AND WAS PLACED ON THE OPERATING TABLE.  ANESTHESIA WAS ADMINISTERED.  ARMS WERE POSITIONED BY ANESTHESIA.  PREOPERATIVE ANTIBIOTIC  AND TXA WERE GIVEN.  BOTH FEET/ANKLES WERE PADDED AND PLACED IN A BOOT.  SCDS WERE PLACED ON BOTH LEGS.  PATIENT WAS POSITIONED ON THE TABLE WITH POST.   PELVIS WAS POSITIONED AND VERIFIED BY C ARM.  THE OPERATIVE HIP WAS PREPPED AND DRAPED IN A SURGICALLY STERILE AND STANDARD FASHION.  TIME OUT WAS PERFORMED.  HIP INCISION WAS MADE APPROXIMATELY 2 cm LATERAL AND 1 cm INFERIOR TO THE ASIS.  SHARP DISSECTION WAS MADE THROUGH THE SUBCUTANEOUS TISSUE.  FASCIAL LAYER WAS SHARPLY DIVIDED.  TENSOR FASCIA MUSCLE WAS IDENTIFIED AND RETRACTED LATERALLY.  SARTORIUS WAS RETRACTED MEDIALLY.  THE CIRCUMFLEX VESSELS WERE TREATED WITH BIPOLAR INSTRUMENT.  ANTERIOR HIP CAPSULE WAS IDENTIFIED.  RECTUS AND HIP FLEXOR WERE RETRACTED MEDIALLY.  SUPERIOR AND INFERIOR EXTRACAPSULAR COBRA RETRACTORS WERE PLACED.  ANTERIOR ACETABULAR RETRACTOR WAS PLACED IN A CAREFUL FASHION.   ANTERIOR CAPSULOTOMY WAS MADE.  FEMORAL HEAD AND NECK WERE IDENTIFIED.   INFERIOR CAPSULAR RELEASE WAS PERFORMED.  DEGENERATIVE CHANGES WERE NOTED.  FEMORAL NECK OSTEOTOMY WAS MADE.  FEMORAL HEAD WAS REMOVED WITH A CORK SCREW.    POSTERIOR AND  INFERIOR ACETABULAR RETRACTORS WERE PLACED CAREFULLY.  GOOD ACETABULAR EXPOSURE WAS OBTAINED.   LABRAL TISSUE WAS EXCISED.  MEDIAL WALL WAS IDENTIFIED AND CLEARED OF SOFT TISSUES.  ACETABULAR REAMING WAS PERFORMED IN A SEQUENTIAL FASHION BY 1-2 mm.  REAMING WAS MEDIALIZED TO THE MEDIAL WALL.  FINAL REAMING WAS 1 mm UNDER SIZED IN RELATION TO THE REAL COMPONENT.   A TRIAL CUP WAS PLACED AND WAS FELT TO BE A STABLE FIT.  C ARM WAS USED TO EXAMINE THE CUP SIZE AND FIT.  ACETABULAR OSTEOPHYTES WERE REMOVED CAREFULLY.   50 mm ACETABULAR COMPONENT WAS IMPACTED WITH APPROPRIATE ABDUCTION AND ANTEVERSION ANGLE.  IT WAS FELT TO BE A STABLE FIT.  ANTERIOR LIP OF THE CUP WAS NOT PROUD.  C ARM WAS USED TO VERIFY THE POSITION OF THE CUP.  A TRIAL LINER WAS INSERTED.  ATTENTION WAS PLACED TO EXPOSING FEMORAL SIDE.  FEMORAL ELEVATOR WAS PLACED.  FOOT WAS EXTERNALLY ROTATED.  SUPERIOR CAPSULAR RELEASE WAS PERFORMED.  HIP WAS THEN EXTENDED AND ADDUCTED.  CAREFUL ATTENTION WAS GIVEN TO ASSESS THE TIGHTNESS OF THE CAPSULE AND POSITION OF GREATER TROCHANTERIC BONE.  TROCHANTERIC RETRACTOR WAS PLACED.  POSTERIOR NECK RETRACTOR WAS PLACED. HIP WAS EXTENDED MORE AND SUPERIOR CAPSULE WAS RELEASED MORE.   GOOD FEMORAL EXPOSURE WAS OBTAINED.   WAS USED TO INITIATE THE STARTING PLACE.  RONGEUR WAS USED TO LATERALIZE THE STARTING HOLE A BIT MORE.   CHILLY PEPPER WAS PLACED IN PROXIMAL FEMUR BY HAND.  LEG WAS BROUGHT BACK TO NEUTRAL POSITION AND C ARM WAS USED TO ASSESS THE POSITION OF THE CHILLY PEPPER.  PROXIMAL FEMUR WAS RE-EXPOSED.  SEQUENTIAL BROACHING WAS CAREFULLY CARRIED OUT UNTIL GOOD FIT WAS OBTAINED.  FINAL BROACH SIZE 5 WAS FELT TO BE AXIALLY AND ROTATIONALLY STABLE.  TRIAL FEMORAL NECK AND HEAD WERE PLACED.  HIP WAS VISUALIZED ON THE C ARM.  LEG LENGTH AND OFFSET WERE ASSESSED.  NO FRACTURE WAS IDENTIFIED.  HIP WAS STABLE TO ANTERIOR STRESS.  ALL THE TRIAL IMPLANTS WERE REMOVED.  WOUND WAS IRRIGATED COPIOUSLY.  HEMOSTASIS  WAS OBTAINED.  ACETABULUM WAS REEXPOSED.  REAL LINER WAS IMPACTED.  ITS SEATING WAS VERIFIED.  LOCAL COCKTAIL WAS INFILTRATED CAREFULLY TO CAPSULE AND SURROUNDING SOFT TISSUE.  PROXIMAL FEMUR WAS EXPOSED.  MORE LOCAL COCKTAIL WAS INFILTRATED TO SURROUNDING SOFT TISSUE.  REAL FEMORAL STEM WAS INSERTED WITH GOOD STABILITY.  FEMORAL HEAD WAS IMPACTED.  NO FEMORAL FRACTURE WAS IDENTIFIED.  HIP WAS REDUCED.  C ARM WAS USED TO CHECK AP AND LATERAL VIEWS.  IMPLANT POSITION AND FIT APPEARED TO BE SATISFACTORY.  NO FRACTURE WAS IDENTIFIED ON C ARM.  WOUND WAS IRRIGATED THROUGH OUT THE CASE.  FASCIAL LAYER WAS CLOSED.  SUBCUTANEOUS LAYER WAS CLOSED IN MUTLIPLE LAYERS.  SKIN WAS CLOSED.  DRESSING WAS APPLIED.  ALL COUNTS WERE CORRECT.  FIRST ASSISTANT WAS NECESSARY FOR PATIENT POSITIONING, RETRACTION OF SOFT TISSUES FOR ACETABULAR AND FEMORAL EXPOSURE, IMPLANT INSERTION, DISLOCATION AND REDUCTION OF THE HIP JOINT, STABILITY TESTING, AND WOUND CLOSURE.    Shaji Carver MD  9/30/2024

## 2024-09-30 NOTE — PLAN OF CARE
Pt A&Ox4, resting in bed. CMS intact to BU&LE  Resp: RA  GI/: due to void (Gregory removed PTA)  Activity/Safety: up w/asst  Skin: R hip incision w/ice wrap  Pain: no medication requested at this time  Pt and  updated on and agreeable to POC; IV fluids, tolerate diet, pain mgmt, PT/OT

## 2024-09-30 NOTE — PHYSICAL THERAPY NOTE
PHYSICAL THERAPY JOINT EVALUATION - INPATIENT     Room Number: 378/378-A  Evaluation Date: 9/30/2024  Type of Evaluation: Initial  Physician Order: PT Eval and Treat    Presenting Problem: s/p R DALIA  Co-Morbidities : GERD, diverticulitis, Depression, CAD, HLD, CKD3, aortic atherosclerosis  Reason for Therapy: Mobility Dysfunction and Discharge Planning    PROCEDURE 9/30/24 Dr Carver:  DIRECT ANTERIOR RIGHT TOTAL HIP REPLACEMENT     PHYSICAL THERAPY ASSESSMENT   Patient is currently functioning below baseline with bed mobility, transfers, gait, stair negotiation, and maintaining seated position. Prior to admission, patient's baseline is indep.   Patient is requiring contact guard assist as a result of the following impairments: decreased functional strength, decreased endurance/aerobic capacity, pain, impaired static and dynamic standing balance, impaired coordination, impaired motor planning, decreased muscular endurance, and limited RLE ROM.  Physical Therapy will continue to follow for duration of hospitalization.    Patient will benefit from continued skilled PT Services at discharge to promote prior level of function and safety with additional support and return home with home health PT.    PLAN  PT Treatment Plan: Bed mobility;Body mechanics;Coordination;Endurance;Energy conservation;Patient education;Family education;Gait training;Neuromuscular re-educate;Range of motion;Strengthening;Stoop training;Stair training;Transfer training;Balance training  Rehab Potential : Good  Frequency (Obs): Daily  Number of Visits to Meet Established Goals: 2    CURRENT GOALS  Goal #1  Patient is able to demonstrate supine - sit EOB @ level: supervision     Goal #2  Patient is able to demonstrate transfers Sit to/from Stand at assistance level: supervision   Goal #3    Patient is able to ambulate 150 feet with assistive device at assistance level: supervision   Goal #4    Patient will negotiate 4 stairs/one curb w/ assistive  device and supervision   Goal #5    Patient verbalizes and/or demonstrates all precautions and safety concerns independently    Goal #6      Goal Comments: Goals established on 2024    PHYSICAL THERAPY MEDICAL/SOCIAL HISTORY  History related to current admission: Patient is a 79 year old female admitted on 2024 from home for R DALIA.    HOME SITUATION  Type of Home: House   Home Layout: One level  Stairs to Enter : 2  Railing: Yes          Lives With: Spouse  Drives: Yes  Patient Owned Equipment: Rolling walker;Cane  Patient Regularly Uses: None    Prior Level of [place]: Per pt typically indep with all aspects of  mobility. Lives in ranch home with spouse  (he is a farmer and it's combining season). 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  Supportive daughter nearby who is a nurse. Drives. No use of device, but does own RW and cane.    SUBJECTIVE  \"Pain meds must really be working!\"     OBJECTIVE  Precautions: Bed/chair alarm  Fall Risk: Standard fall risk    WEIGHT BEARING RESTRICTION  Weight Bearing Restriction: R lower extremity        R Lower Extremity: Weight Bearing as Tolerated       PAIN ASSESSMENT  Ratin  Location: denies pain at this time       COGNITION  Overall Cognitive Status:  WFL - within functional limits    RANGE OF MOTION AND STRENGTH ASSESSMENT  Upper extremity ROM and strength are within functional limits   Lower extremity ROM is within functional limits   Lower extremity strength is within functional limits     BALANCE  Static Sitting: Good  Dynamic Sitting: Fair +  Static Standing: Fair  Dynamic Standing: Fair -    ADDITIONAL TESTS                                    ACTIVITY TOLERANCE                         O2 WALK       NEUROLOGICAL FINDINGS                        AM-PAC '6-Clicks' INPATIENT SHORT FORM - BASIC MOBILITY  How much difficulty does the patient currently have...  Patient Difficulty: Turning over in bed (including adjusting bedclothes, sheets and blankets)?:  A Little   Patient Difficulty: Sitting down on and standing up from a chair with arms (e.g., wheelchair, bedside commode, etc.): A Little   Patient Difficulty: Moving from lying on back to sitting on the side of the bed?: A Little   How much help from another person does the patient currently need...   Help from Another: Moving to and from a bed to a chair (including a wheelchair)?: A Little   Help from Another: Need to walk in hospital room?: A Little   Help from Another: Climbing 3-5 steps with a railing?: A Little       AM-PAC Score:  Raw Score: 18   Approx Degree of Impairment: 46.58%   Standardized Score (AM-PAC Scale): 43.63   CMS Modifier (G-Code): CK    FUNCTIONAL ABILITY STATUS  Gait Assessment   Functional Mobility/Gait Assessment  Gait Assistance: Supervision;Contact guard assist  Distance (ft): 10,  Assistive Device: Rolling walker  Pattern: R Decreased stance time    Skilled Therapy Provided    Bed Mobility:  Rolling: NT  Supine to sit: supervision w/ HOB elevated   Sit to supine: NT     Transfer Mobility:  Sit to stand: [facility] w/ RW  Stand to sit: [facility]  Gait = [facility] w/ RW  x 10 feet, decr RLE stance time, incr reliance BUE for offloading     Therapist's Comments:   Patient presents sitting up in bed. Spouse present bedside. Discussed role and goal of physical therapy POD0 DALIA. Pt in agreement to session.  Educated on WBAT to RLE- pt verbalizes understanding.  Bed mobility at supervision w/ HOB flat. Sit to stand to RW at [facility].  Ambulated to bathroom with RW at [facility].  Toilet transfer CGA- encouraged to extend RLE for ease of transfer. Pt noted to have diarrhea (pt stating she can't feel herself having bowel movement as she feels numb in that area). Pt requesting to sit on toilet for a little while - PCT present in room now.  Encouraged cont use of ice for pain and swelling management. Recommend cont ambulation with nursing staff and use of RW. Pt verbalizes understanding.     Exercise/Education Provided:  Bed  mobility  Body mechanics  Energy conservation  Functional activity tolerated  Gait training  Posture  Transfer training    Patient End of Session: Up in chair;Needs met;Call light within reach;RN aware of session/findings;All patient questions and concerns addressed;SCDs in place;Ice applied;Alarm set;Discussed recommendations with /    Patient Evaluation Complexity Level:  History Moderate - 1 or 2 personal factors and/or co-morbidities   Examination of body systems Low -  addressing 1-2 elements   Clinical Presentation Low- Stable   Clinical Decision Making Low Complexity     PT Session Time: 30 minutes  Therapeutic Activity: 15 minutes

## 2024-09-30 NOTE — ANESTHESIA POSTPROCEDURE EVALUATION
[facility]    Chloe Wall Patient Status:  Outpatient in a Bed   Age/Gender 79 year old female MRN [id number]   Location [facility] POST ANESTHESIA CARE UNIT Attending Shaji Carver MD   Hosp Day # 0 PCP Edward Morris MD       Anesthesia Post-op Note    RIGHT ANTERIOR TOTAL HIP REPLACEMENT    Procedure Summary       Date: 09/30/24 Room / Location:  MAIN OR 12 / [facility] MAIN OR    Anesthesia Start: 1102 Anesthesia Stop: 1233    Procedure: RIGHT ANTERIOR TOTAL HIP REPLACEMENT (Right: Hip) Diagnosis: (RIGHT HIP OSTEOARTHRITIS)    Surgeons: Shaji Carver MD Responsible Provider: Shade Garcia MD    Anesthesia Type: spinal ASA Status: 2            Anesthesia Type: spinal    Vitals Value Taken Time   /54 09/30/24 1310   Temp 98.2 °F (36.8 °C) 09/30/24 1320   Pulse 75 09/30/24 1325   Resp 15 09/30/24 1325   SpO2 98 % 09/30/24 1325   Vitals shown include unfiled device data.    Patient Location: PACU    Anesthesia Type: spinal    Airway Patency: patent    Postop Pain Control: adequate    Mental Status: preanesthetic baseline    Nausea/Vomiting: none    Cardiopulmonary/Hydration status: stable euvolemic    Complications: no apparent anesthesia related complications    Postop vital signs: stable    Dental Exam: Unchanged from Preop    Patient to be discharged from PACU when criteria met.

## 2024-10-01 VITALS
HEIGHT: 64.25 IN | RESPIRATION RATE: 20 BRPM | HEART RATE: 82 BPM | TEMPERATURE: 98 F | BODY MASS INDEX: 32.11 KG/M2 | OXYGEN SATURATION: 95 % | SYSTOLIC BLOOD PRESSURE: 135 MMHG | WEIGHT: 188.06 LBS | DIASTOLIC BLOOD PRESSURE: 64 MMHG

## 2024-10-01 PROCEDURE — 97116 GAIT TRAINING THERAPY: CPT

## 2024-10-01 PROCEDURE — 97535 SELF CARE MNGMENT TRAINING: CPT

## 2024-10-01 PROCEDURE — 97530 THERAPEUTIC ACTIVITIES: CPT

## 2024-10-01 PROCEDURE — 97165 OT EVAL LOW COMPLEX 30 MIN: CPT

## 2024-10-01 NOTE — PLAN OF CARE
Alert and oriented x 4. Vitals stable on room air. Pain managed on PO scheduled medications. Denies numbness/tingling. Dressing to right hip clean, dry, intact. Voiding without difficulty. Last BM 09/30. Tolerating regular diet. Skin check with nursing student, see flowsheets. SCD's on bilaterally. Safety precautions in place. Plan for discharge with home health. Plan of care discussed with patient.

## 2024-10-01 NOTE — PROGRESS NOTES
[facility] Hospitalist Progress Note                                                                     [facility]   part of [facility]      Chloe Wall  9/20/1945    SUBJECTIVE: no chest pain, palpitations, shortness of breath, cough, nausea, vomiting, abdominal pain. Post operative pain controlled.     OBJECTIVE:  Temp:  [97 °F (36.1 °C)-98.6 °F (37 °C)] 98.4 °F (36.9 °C)  Pulse:  [64-99] 89  Resp:  [10-20] 18  BP: ()/(46-80) 119/68  SpO2:  [94 %-100 %] 95 %  Exam  Gen: No acute distress, alert and oriented   Pulm: Lungs clear bilaterally, normal respiratory effort, no crackles, no wheezing  CV: Heart with regular rate and rhythm, no murmur.    Abd: Abdomen soft, nontender, nondistended, bowel sounds present  MSK: no significant pitting edema or tenderness of the LE  Skin: no new rashes or lesions    Labs:   No results for input(s): \"WBC\", \"HGB\", \"MCV\", \"PLT\", \"BAND\", \"INR\" in the last 168 hours.    Invalid input(s): \"LYM#\", \"MONO#\", \"BASOS#\", \"EOSIN#\"    No results for input(s): \"NA\", \"K\", \"CL\", \"CO2\", \"BUN\", \"CREATSERUM\", \"CA\", \"CAION\", \"MG\", \"PHOS\", \"GLU\" in the last 168 hours.    No results for input(s): \"ALT\", \"AST\", \"ALB\", \"AMYLASE\", \"LIPASE\", \"LDH\" in the last 168 hours.    Invalid input(s): \"ALPHOS\", \"TBIL\", \"DBIL\", \"TPROT\"    No results for input(s): \"PGLU\" in the last 168 hours.    Meds:   Scheduled:    aspirin  81 mg Oral BID    sennosides  17.2 mg Oral Nightly    docusate sodium  100 mg Oral BID    famotidine  20 mg Oral BID    Or    famotidine  20 mg Intravenous BID    ferrous sulfate  325 mg Oral Daily with breakfast    acetaminophen  1,000 mg Oral Q8H SRAVANTHI    traMADol  50 mg Oral 4 times per day    dexAMETHasone PF  8 mg Intravenous Once     Continuous Infusions:    lactated ringers Stopped (09/30/24 1233)    lactated ringers       PRN:   sodium chloride    polyethylene glycol (PEG 3350)    magnesium hydroxide    bisacodyl     fleet enema    ondansetron    metoclopramide    diphenhydrAMINE **OR** diphenhydrAMINE    oxyCODONE **OR** oxyCODONE    HYDROmorphone **OR** HYDROmorphone    tiZANidine    ASSESSMENT / PLAN:   79 year old female with history of kidney stones, CKD, fatty liver, diverticulosis, GERD, fibroids presenting with right hip primary osteoarthritis status post direct anterior right total hip replacement.     Right hip primary osteoarthritis   -POD # 1 status post direct anterior right total hip replacement.  -ortho following  -pt/ot     Post Operative Pain   -acetaminophen po atc  -decadron iv  -tramadol po atc  -hydromorphone iv prn  -oxy po prn   -tizanidine po prn   -dc pain meds per ortho service     Quality:  DVT Prophylaxis: asa po bid  CODE status:   Gregory:      Plan of care discussed with patient and staff     Dispo: medically stable for discharge     Waqar Escalera MD  [facility] Hospitalist  [phone]

## 2024-10-01 NOTE — PLAN OF CARE
Assumed patient care @2200.     Alert and oriented x 4. VSS; on 2L O2 NC during NOC. Pain controlled with medications (see mar). Patient denies any numbness or tingling. Dressings clean, dry, and intact. Voiding without difficulty. Tolerating diet. Up standby x1 with a walker. PT following, OT to see. POC discussed with patient. Safety precautions in place. Call light within reach.

## 2024-10-01 NOTE — PHYSICAL THERAPY NOTE
PHYSICAL THERAPY TREATMENT NOTE - INPATIENT    Room Number: 378/378-A     Session: 1     Number of Visits to Meet Established Goals: 2    Presenting Problem: s/p R DALIA  Co-Morbidities : GERD, diverticulitis, Depression, CAD, HLD, CKD3, aortic atherosclerosis    ASSESSMENT   Patient demonstrates good  progress this session, goals updated to reflect patient performance.    Patient continues to function near baseline with bed mobility, transfers, gait, and stair negotiation. Contributing factors to remaining limitations include decreased functional strength and limited RLE  ROM.      Patient continues to benefit from continued skilled PT services: at discharge to promote prior level of function.  Anticipate patient will return home with home health PT.    PLAN  PT Device Recommendation: Rolling walker;Cane  PT Treatment Plan: Bed mobility;Body mechanics;Coordination;Endurance;Energy conservation;Patient education;Family education;Gait training;Neuromuscular re-educate;Range of motion;Strengthening;Stoop training;Stair training;Transfer training;Balance training  Rehab Potential : Good  Frequency (Obs): Daily    CURRENT GOALS       Goal #1  Patient is able to demonstrate supine - sit EOB @ level: supervision      Goal #2  Patient is able to demonstrate transfers Sit to/from Stand at assistance level: supervision  Met    Goal #3     Patient is able to ambulate 150 feet with assistive device at assistance level: supervision  Met    Goal #4     Patient will negotiate 4 stairs/one curb w/ assistive device and supervision   Goal #5     Patient verbalizes and/or demonstrates all precautions and safety concerns independently    Goal #6        Goal Comments: Goals established on 9/30/2024  10/1/2024 all goals adequate for d/c     SUBJECTIVE  \"My daughter is a nurse.\"     OBJECTIVE  Precautions: Bed/chair alarm    WEIGHT BEARING RESTRICTION  R Lower Extremity: Weight Bearing as Tolerated    PAIN ASSESSMENT   Rating:  0  Location: pt denies pain       BALANCE                                                                                                                       Static Sitting: Good  Dynamic Sitting: Fair +           Static Standing: Fair -  Dynamic Standing: Fair -    ACTIVITY TOLERANCE                         O2 WALK       AM-PAC '6-Clicks' INPATIENT SHORT FORM - BASIC MOBILITY  How much difficulty does the patient currently have...  Patient Difficulty: Turning over in bed (including adjusting bedclothes, sheets and blankets)?: None   Patient Difficulty: Sitting down on and standing up from a chair with arms (e.g., wheelchair, bedside commode, etc.): None   Patient Difficulty: Moving from lying on back to sitting on the side of the bed?: None   How much help from another person does the patient currently need...   Help from Another: Moving to and from a bed to a chair (including a wheelchair)?: None   Help from Another: Need to walk in hospital room?: None   Help from Another: Climbing 3-5 steps with a railing?: A Little     AM-PAC Score:  Raw Score: 23   Approx Degree of Impairment: 11.2%   Standardized Score (AM-PAC Scale): 56.93   CMS Modifier (G-Code): CI    FUNCTIONAL ABILITY STATUS  Gait Assessment   Functional Mobility/Gait Assessment  Gait Assistance: Supervision  Distance (ft): 325  Assistive Device: Rolling walker  Pattern: R Decreased stance time  Stairs: Stoop/curb;Car transfer  Stoop/Curb: CGA  Car transfer: supervision    Skilled Therapy Provided  Pt presents seated in bS chair  Therapist educated pt on safe mobility c RW  Pt was gait and t/f trained c RW , cues for sequencing and safe mobility .   Reviewed HEP per protocol.   Bed Mobility:  Rolling:    Supine<>Sit:    Sit<>Supine:      Transfer Mobility:  Sit<>Stand: supervision    Stand<>Sit: supervision    Gait: supervision     Therapist's Comments: pt reports surgeon informed pt's daughter that pt's bones need time to heal and that pt should not  put full weight on affected LE. Per orders, WBAT with no restrictions.       THERAPEUTIC EXERCISES  Lower Extremity Ankle pumps  Knee extension  LAQ     Upper Extremity      Position Sitting     Repetitions   10   Sets   1     Patient End of Session: Up in chair;Needs met;Call light within reach;RN aware of session/findings;All patient questions and concerns addressed;Hospital anti-slip socks;Alarm set;Ice applied    PT Session Time: 30 minutes  Gait Training: 15 minutes  Therapeutic Activity: 15 minutes  Therapeutic Exercise:  minutes   Neuromuscular Re-education:  minutes

## 2024-10-01 NOTE — PROGRESS NOTES
[facility]   part of [facility]        Chloe Wall Patient Status:  Outpatient in a Bed    1945 MRN [id number]   Location [facility] 3SW-A Attending Shaji Carver MD   Hosp Day # 0 PCP Edward Morris MD       Subjective:    POD #1 s/p right anterior total hip arthoplasty  Feeling well.  No major complaints.  No calf pain, CP, SOB, lightheadedness, nausea.      Physical Exam:  Temp:  [97 °F (36.1 °C)-98.6 °F (37 °C)] 97.3 °F (36.3 °C)  Pulse:  [59-99] 59  Resp:  [10-20] 18  BP: ()/(46-80) 107/58  SpO2:  [94 %-100 %] 95 %    In no acute distress  Hip Aquacel dressing is clean and dry.  No signs of infection  Thigh and leg are soft.  Both calves are NT.  No signs of DVT.  NVI + PF/DF +PP    Data review:  Post op x-ray reviewed.    S/p Right anterior THR      Today's plan discussed.  PT/OT  DVT prophylaxis with  aspirin  Anticipate DC to home with home health services.  Follow up in office in 2 weeks    Lyric Rivera PA-C

## 2024-10-01 NOTE — OCCUPATIONAL THERAPY NOTE
OCCUPATIONAL THERAPY EVALUATION - INPATIENT    Room Number: 378/378-A  Evaluation Date: 10/1/2024     Type of Evaluation: Initial  Presenting Problem: R DALIA    Physician Order: IP Consult to Occupational Therapy  Reason for Therapy:  ADL/IADL Dysfunction and Discharge Planning    OCCUPATIONAL THERAPY ASSESSMENT   Patient is a 79 year old female admitted on 9/30/2024 with Presenting Problem: R DALIA. Co-Morbidities : CKD, fibroids, diverticulosis  Patient is currently functioning near baseline with upper body dressing, lower body dressing, bed mobility, transfers, static standing balance, dynamic standing balance, and functional standing tolerance.  Prior to admission, patient's baseline is independent.  Patient met all OT goals at supervision level.  Patient reports no further questions/concerns at this time.     Patient will benefit from continued skilled OT Services at discharge to promote prior level of function.  Anticipate patient will return home with home health OT    Recommendations for nursing staff:   Transfers: 1 person, RW  Toileting location: Toilet    EVALUATION SESSION:  Patient at start of session: Supine    FUNCTIONAL TRANSFER ASSESSMENT  Sit to Stand: Chair; Edge of Bed  Edge of Bed: Supervision  Chair: Supervision    BED MOBILITY  Supine to Sit : Modified Independent    BALANCE ASSESSMENT     FUNCTIONAL ADL ASSESSMENT  Grooming Standing: Supervision  UB Dressing Seated: Independent  LB Dressing Seated: Modified Independent  LB Dressing Standing: Supervision  Toileting Seated: Modified Independent  Toileting Standing: Supervision    ACTIVITY TOLERANCE:                          O2 SATURATIONS       COGNITION  Overall Cognitive Status:  WFL - within functional limits  Not formally tested    COGNITION ASSESSMENTS     Upper Extremity:   ROM: within functional limits   Strength: is within functional limits   Coordination:  Gross motor: wfl  Fine motor: wfl  Sensation:  denied deficits    EDUCATION  PROVIDED  Patient Education : Role of Occupational Therapy; Plan of Care; Discharge Recommendations; Functional Transfer Techniques; Surgical Precautions; Posture/Positioning; Energy Conservation  Patient's Response to Education: Verbalized Understanding; Returned Demonstration    Equipment used: rw, gait belt, sock aide  Demonstrates functional use    Therapist comments:   OT explained OT role and session goals. Informed patient that she is WBAT to RLE and has no hip restrictions per orders. Patient told OT that her family was told by Dr Carver that patient has \"thin bones\" and asked this writer to call her daughter to verify since she was concerned that she might not be allowed to cross legs . OT called patient's daughter Aleksandra. OT informed her that orders state WBAT and no restrictions. Aleksandra stated that surgeon told her that patient  should place most of her weight on her LLE when standing up and should lead with LLE when going up stairs. He did not have concerns about rotation. OT informed RN and PT of above conversation. OT sent Perfect Serve message to surgeon to attempt to clarify and requested RN to have PA verify/clarify.     OT educated patient to advance RLE when sitting or standing to ensure that most of her weight is through LLE. Patient completed toileting on standard toilet with supervision. She was educated on sequencing, safety, energy conservation for lower body dressing. Patient reported that she has had no trouble getting her legs into bed. OT educated patient on benefit of shower chair and to have family close by upon initial shower for safety. Understanding was voiced.   LEft in chair with needs met, chair alarm on , call light in reach, gel ice applied and LEs elevated.     Patient End of Session: Needs met;Call light within reach;RN aware of session/findings;All patient questions and concerns addressed;Alarm set;With  staff;Up in chair;Ice applied;Hospital anti-slip socks    OCCUPATIONAL  PROFILE    HOME SITUATION  Type of Home: House  Home Layout: One level  Lives With: Spouse    Toilet and Equipment: Comfort height toilet;Other (Comment) (has tub and sink next to toilet)  Shower/Tub and Equipment: Tub only;Walk-in shower       Occupation/Status: CPA, retired     Drives: Yes  Patient Regularly Uses: None    Prior Level of Function: Independent, drives, IND with ADLs and mobility, enjoys quilting. Lives in a 1 level house with her spouse who is a farmer and is able to assist as needed    SUBJECTIVE  Pleasant and participatory    PAIN ASSESSMENT  Ratin  Location: R hip       OBJECTIVE  Precautions: Bed/chair alarm  Fall Risk: Standard fall risk    WEIGHT BEARING RESTRICTION  R Lower Extremity: Weight Bearing as Tolerated      AM-PAC ‘6-Clicks’ Inpatient Daily Activity Short Form  -   Putting on and taking off regular lower body clothing?: A Little  -   Bathing (including washing, rinsing, drying)?: A Little  -   Toileting, which includes using toilet, bedpan or urinal? : A Little  -   Putting on and taking off regular upper body clothing?: None  -   Taking care of personal grooming such as brushing teeth?: None  -   Eating meals?: None    AM-PAC Score:  Score: 21  Approx Degree of Impairment: 32.79%  Standardized Score (AM-PAC Scale): 44.27    ADDITIONAL TESTS     NEUROLOGICAL FINDINGS      PLAN   Patient has been evaluated and presents with no skilled Occupational Therapy needs at this time.  Patient discharged from Occupational Therapy services.  Please re-order if a new functional limitation presents during this admission.    OT Device Recommendations: Other (Comment) (may benefit from shower chiar)    Patient Evaluation Complexity Level:   Occupational Profile/Medical History LOW - Brief history including review of medical or therapy records    Specific performance deficits impacting engagement in ADL/IADL MODERATE  3 - 5 performance deficits   Client Assessment/Performance Deficits MODERATE -  Comorbidities and min to mod modifications of tasks    Clinical Decision Making LOW - Analysis of occupational profile, problem-focused assessments, limited treatment options    Overall Complexity LOW     OT Session Time: 30 minutes  Self-Care Home Management: 25 minutes

## 2024-10-01 NOTE — CM/SW NOTE
Met with pt at bedside to discuss DC planning.  Also spoke with pt's dtr by phone.  [facility] information for [facility] given.  All questions/concerns addressed.  No further needs at this time.  / to remain available for support and/or discharge planning.     Juanita Craig, [facility]  Discharge Planner  [phone]

## (undated) DEVICE — GLOVE SUR 7 SENSICARE PI PIP GRN PWD F

## (undated) DEVICE — GLOVE SUR 7.5 SENSICARE PI PIP CRM PWD F

## (undated) DEVICE — SUT STRATAFIX MCRYL + 3-0 30X30CM ABSRB UD PS-2

## (undated) DEVICE — ADHESIVE SKIN TOP FOR WND CLSR DERMBND ADV

## (undated) DEVICE — WRAP HIP COMPR

## (undated) DEVICE — NEPTUNE E-SEP SMOKE EVACUATION PENCIL, COATED, 70MM BLADE, PUSH BUTTON SWITCH: Brand: NEPTUNE E-SEP

## (undated) DEVICE — SUT MCRYL 3-0 27IN ABSRB UD 19MM PS-2 3/8

## (undated) DEVICE — SLEEVE COMPR MD KNEE LEN SGL USE KENDALL SCD

## (undated) DEVICE — SOLUTION IRRIG 3000ML 0.9% NACL FLX CONT

## (undated) DEVICE — ANTIBACTERIAL UNDYED BRAIDED (POLYGLACTIN 910), SYNTHETIC ABSORBABLE SUTURE: Brand: COATED VICRYL

## (undated) DEVICE — SYRINGE MED 20ML STD CLR PLAS LL TIP N CTRL

## (undated) DEVICE — COVER,LIGHT,CAMERA,HARD,1/PK,STRL: Brand: MEDLINE

## (undated) DEVICE — PTFE COATED BLADE 4': Brand: MEDLINE

## (undated) DEVICE — GLOVE SUR 7 SENSICARE PI PIP CRM PWD F

## (undated) DEVICE — GOWN SUR 2XL LEV 4 BLU W/ WHT V NK BND AERO

## (undated) DEVICE — HOOD, PEEL-AWAY: Brand: FLYTE

## (undated) DEVICE — SUT VCRL 1 27IN CPX ABSRB UD L48MM 1/2 CIR

## (undated) DEVICE — 3M™ IOBAN™ 2 ANTIMICROBIAL INCISE DRAPE 6650EZ: Brand: IOBAN™ 2

## (undated) DEVICE — 450 ML BOTTLE OF 0.05% CHLORHEXIDINE GLUCONATE IN 99.95% STERILE WATER FOR IRRIGATION, USP AND APPLICATOR.: Brand: IRRISEPT ANTIMICROBIAL WOUND LAVAGE

## (undated) DEVICE — ZIPWIRE GUIDEWIRE .035X150 STR

## (undated) DEVICE — RECIPROCATING BLADE HEAVY DUTY LONG, OFFSET  (77.6 X 0.77 X 11.2MM)

## (undated) DEVICE — BIPOLAR SEALER 23-112-1 AQM 6.0: Brand: AQUAMANTYS™

## (undated) DEVICE — FIBER LSR 200UM 2J 80HZ 60W DL FOR LITHO

## (undated) DEVICE — PACK PBDS CYSTOSCOPY

## (undated) DEVICE — NEEDLE SPNL 20GA L3.5IN YEL QNCKE STYL DISP

## (undated) DEVICE — CATHETER URET 5FR L70CM FLX OPN TIP NONPORTED

## (undated) DEVICE — ANTERIOR HIP: Brand: MEDLINE INDUSTRIES, INC.

## (undated) DEVICE — SUT ETHBND XL 5 30IN CCS NABSRB GRN 48MM 1/2

## (undated) DEVICE — Device

## (undated) DEVICE — 1016 S-DRAPE IRRIG POUCH 10/BOX: Brand: STERI-DRAPE™

## (undated) NOTE — LETTER
OUTSIDE TESTING RESULT REQUEST     IMPORTANT: FOR YOUR IMMEDIATE ATTENTION  Please FAX all test results listed below to: [phone]     Testing already done on or about: 2024     * * * * If testing is NOT complete, arrange with patient A.S.A.P. * * * *      Patient Name: Chloe Wall  Surgery Date: 2024  Medical Record: [id number]  CSN: [id number]  : 1945 - A: 79 y     Sex: female  Surgeon(s):  Shaji Carver MD  Procedure: RIGHT ANTERIOR TOTAL HIP REPLACEMENT  Anesthesia Type: Spinal     Surgeon: Shaji Carver MD     The following Testing and Time Line are REQUIRED PER ANESTHESIA     EKG READ AND SIGNED WITHIN   90 days      Thank You,   Sent by: Gris RIDER

## (undated) NOTE — LETTER
OUTSIDE TESTING RESULT REQUEST     IMPORTANT: FOR YOUR IMMEDIATE ATTENTION  Please FAX all test results listed below to: [phone]     * * * * If testing is NOT complete, arrange with patient A.S.A.P. * * * *      Patient Name: Chloe Wall  Surgery Date: 2024  Medical Record: [id number]  CSN: [id number]  : 1945 - A: 78 y     Sex: female  Surgeon(s):  Robbin Esteban MD  Procedure: CYSTOSCOPY, RIGHT RETROGRADE PYELOGRAM, RIGHT URETEROSCOPY, RIGHT URETERAL STENT PLACEMENT, HOLMIUM LASER LITHOTRIPSY  Anesthesia Type: General     Surgeon: Robbin Esteban MD     The following Testing and Time Line are REQUIRED PER ANESTHESIA     BMP (requires 4 hour fast) within  90 days      Thank You,   Sent by:CHANDU RIDER

## (undated) NOTE — LETTER
UTSIDE TESTING RESULT REQUEST     IMPORTANT: FOR YOUR IMMEDIATE ATTENTION  Please FAX all test results listed below to: [phone]     Testing already done on or about: appt with you @ [id number]      * * * * If testing is NOT complete, arrange with patient A.S.A.P. * * * *      Patient Name: Chloe Wall  Surgery Date: 2024  Medical Record: [id number]  CSN: [id number]  : 1945 - A: 78 y     Sex: female  Surgeon(s):  Robbin Esteban MD  Procedure: CYSTOSCOPY, RIGHT RETROGRADE PYELOGRAM, RIGHT URETEROSCOPY, RIGHT URETERAL STENT PLACEMENT, HOLMIUM LASER LITHOTRIPSY  Anesthesia Type: General     Surgeon: Robbin Esteban MD     The following Testing and Time Line are REQUIRED PER ANESTHESIA     BMP (requires 4 hour fast) within  60 days      Thank You,   Sent by:love kessler

## (undated) NOTE — LETTER
[facility] [address], [address]  Consent for Procedure/Sedation    Date:     Time:       1.  I authorize the performance upon Ophelia Camarillo the following:cardiac catheterization, left ventricular cineangiography, bilateral s period, the physician will determine when the applicable recovery period ends for purposes of reinstating the Do Not Resuscitate (DNR) order.     Signature of Patient: ____________________________________________________    Signature of person authorized